# Patient Record
Sex: FEMALE | Race: BLACK OR AFRICAN AMERICAN | Employment: UNEMPLOYED | RURAL
[De-identification: names, ages, dates, MRNs, and addresses within clinical notes are randomized per-mention and may not be internally consistent; named-entity substitution may affect disease eponyms.]

---

## 2019-09-30 ENCOUNTER — OFFICE VISIT (OUTPATIENT)
Dept: INTERNAL MEDICINE CLINIC | Age: 54
End: 2019-09-30

## 2019-09-30 VITALS
DIASTOLIC BLOOD PRESSURE: 82 MMHG | RESPIRATION RATE: 16 BRPM | TEMPERATURE: 98.1 F | SYSTOLIC BLOOD PRESSURE: 136 MMHG | HEART RATE: 73 BPM | WEIGHT: 192 LBS | HEIGHT: 65 IN | BODY MASS INDEX: 31.99 KG/M2 | OXYGEN SATURATION: 98 %

## 2019-09-30 DIAGNOSIS — M79.673 PAIN OF FOOT, UNSPECIFIED LATERALITY: ICD-10-CM

## 2019-09-30 DIAGNOSIS — Z13.220 SCREENING CHOLESTEROL LEVEL: ICD-10-CM

## 2019-09-30 DIAGNOSIS — I10 HYPERTENSION, ESSENTIAL: Primary | ICD-10-CM

## 2019-09-30 DIAGNOSIS — Z72.0 TOBACCO ABUSE: ICD-10-CM

## 2019-09-30 DIAGNOSIS — K21.9 GASTROESOPHAGEAL REFLUX DISEASE, ESOPHAGITIS PRESENCE NOT SPECIFIED: ICD-10-CM

## 2019-09-30 RX ORDER — OMEPRAZOLE 40 MG/1
40 CAPSULE, DELAYED RELEASE ORAL DAILY
COMMUNITY
End: 2019-09-30 | Stop reason: SDUPTHER

## 2019-09-30 RX ORDER — MELOXICAM 7.5 MG/1
7.5 TABLET ORAL DAILY
Qty: 90 TAB | Refills: 1 | Status: SHIPPED | OUTPATIENT
Start: 2019-09-30 | End: 2019-12-12 | Stop reason: ALTCHOICE

## 2019-09-30 RX ORDER — AMLODIPINE BESYLATE 5 MG/1
5 TABLET ORAL DAILY
Qty: 90 TAB | Refills: 1 | Status: SHIPPED | OUTPATIENT
Start: 2019-09-30 | End: 2020-04-07

## 2019-09-30 RX ORDER — OMEPRAZOLE 40 MG/1
40 CAPSULE, DELAYED RELEASE ORAL DAILY
Qty: 90 CAP | Refills: 1 | Status: SHIPPED | OUTPATIENT
Start: 2019-09-30 | End: 2020-04-07

## 2019-09-30 RX ORDER — MELOXICAM 7.5 MG/1
TABLET ORAL DAILY
COMMUNITY
End: 2019-09-30 | Stop reason: SDUPTHER

## 2019-09-30 RX ORDER — AMLODIPINE BESYLATE 5 MG/1
5 TABLET ORAL DAILY
COMMUNITY
End: 2019-09-30 | Stop reason: SDUPTHER

## 2019-09-30 RX ORDER — BUPROPION HYDROCHLORIDE 100 MG/1
100 TABLET ORAL 2 TIMES DAILY
Qty: 60 TAB | Refills: 2 | Status: SHIPPED | OUTPATIENT
Start: 2019-09-30 | End: 2019-12-12 | Stop reason: ALTCHOICE

## 2019-09-30 NOTE — PROGRESS NOTES
Subjective:     Inga Jama is a 48 y.o. female who presents for follow up of hypertension. Patient is new to this clinic. Comes in to establish care. Previous care was with Dr.collins carty. Reason for the change is: change of ins. Some HB ok with med  New concerns: wants to quit smoking  C/o knots on foot that hurts    Current Outpatient Medications   Medication Sig Dispense Refill    amLODIPine (NORVASC) 5 mg tablet Take 5 mg by mouth daily.  omeprazole (PRILOSEC) 40 mg capsule Take 40 mg by mouth daily.  meloxicam (MOBIC) 7.5 mg tablet Take  by mouth daily. No Known Allergies    Diet and Lifestyle: smoker 0.5 pack daily, alcohol intake occa beer or shot    Cardiovascular ROS: taking medications as instructed, no medication side effects noted, no TIA's, no chest pain on exertion, no dyspnea on exertion, no swelling of ankles. Review of Systems, additional:  Pertinent items are noted in HPI. Patient Active Problem List    Diagnosis Date Noted    Hypertension, essential 09/30/2019    Tobacco abuse 09/30/2019    GERD (gastroesophageal reflux disease) 09/30/2019     Current Outpatient Medications   Medication Sig Dispense Refill    amLODIPine (NORVASC) 5 mg tablet Take 5 mg by mouth daily.  omeprazole (PRILOSEC) 40 mg capsule Take 40 mg by mouth daily.  meloxicam (MOBIC) 7.5 mg tablet Take  by mouth daily. No Known Allergies  Past Medical History:   Diagnosis Date    Asthma     GERD (gastroesophageal reflux disease)      Past Surgical History:   Procedure Laterality Date    HX TUBAL LIGATION      btl     Family History   Problem Relation Age of Onset    Cancer Father      Social History     Tobacco Use    Smoking status: Current Every Day Smoker     Packs/day: 0.50    Smokeless tobacco: Never Used   Substance Use Topics    Alcohol use:  Yes     Alcohol/week: 1.0 standard drinks     Types: 1 Shots of liquor per week     Comment: occ Objective:     Physical exam significant for the following: WNL    Visit Vitals  /82   Pulse 73   Temp 98.1 °F (36.7 °C) (Oral)   Resp 16   Ht 5' 5\" (1.651 m)   Wt 192 lb (87.1 kg)   LMP 09/25/2019 (Approximate)   SpO2 98%   BMI 31.95 kg/m²     Appearance: alert, well appearing, and in no distress. General exam: CVS exam BP noted to be well controlled today in office, S1, S2 normal, no gallop, no murmur, chest clear, no JVD, no HSM, no edema. . 2 cm nodules sq on feet    Assessment/Plan:     hypertension well controlled, stable      ICD-10-CM ICD-9-CM    1. Hypertension, essential I10 401.9 amLODIPine (NORVASC) 5 mg tablet      METABOLIC PANEL, COMPREHENSIVE   2. Tobacco abuse Z72.0 305.1 buPROPion (WELLBUTRIN) 100 mg tablet   3. Gastroesophageal reflux disease, esophagitis presence not specified K21.9 530.81 omeprazole (PRILOSEC) 40 mg capsule   4. Pain of foot, unspecified laterality M79.673 729.5 meloxicam (MOBIC) 7.5 mg tablet      REFERRAL TO PODIATRY   5. Screening cholesterol level Z13.220 V77.91 LIPID PANEL     . Orders Placed This Encounter    METABOLIC PANEL, COMPREHENSIVE     Standing Status:   Future     Standing Expiration Date:   3/30/2020    LIPID PANEL     Standing Status:   Future     Standing Expiration Date:   3/30/2020    REFERRAL TO PODIATRY     Referral Priority:   Routine     Referral Type:   Consultation     Referral Reason:   Specialty Services Required     Referred to Provider:   Rachael Catalan DPM     Number of Visits Requested:   1    DISCONTD: amLODIPine (NORVASC) 5 mg tablet     Sig: Take 5 mg by mouth daily.  DISCONTD: omeprazole (PRILOSEC) 40 mg capsule     Sig: Take 40 mg by mouth daily.  DISCONTD: meloxicam (MOBIC) 7.5 mg tablet     Sig: Take  by mouth daily.  amLODIPine (NORVASC) 5 mg tablet     Sig: Take 1 Tab by mouth daily. Dispense:  90 Tab     Refill:  1    omeprazole (PRILOSEC) 40 mg capsule     Sig: Take 1 Cap by mouth daily. Dispense:  90 Cap     Refill:  1    meloxicam (MOBIC) 7.5 mg tablet     Sig: Take 1 Tab by mouth daily. Dispense:  90 Tab     Refill:  1    buPROPion (WELLBUTRIN) 100 mg tablet     Sig: Take 1 Tab by mouth two (2) times a day. Start 1 tablet daily ncrease as tolerated, quit. Indications: Stop Smoking     Dispense:  60 Tab     Refill:  2     Follow-up and Dispositions    · Return in about 6 weeks (around 11/11/2019) for routine follow up.

## 2019-09-30 NOTE — PATIENT INSTRUCTIONS
Deciding About Using Medicines To Quit Smoking  How can you decide about using medicines to quit smoking? What are the medicines you can use? Your doctor may prescribe varenicline (Chantix) or bupropion (Zyban). These medicines can help you cope with cravings for tobacco. They are pills that don't contain nicotine. You also can use nicotine replacement products. These do contain nicotine. There are many types. · Gum and lozenges slowly release nicotine into your mouth. · Patches stick to your skin. They slowly release nicotine into your bloodstream.  · An inhaler has a deras that contains nicotine. You breathe in a puff of nicotine vapor through your mouth and throat. · Nasal spray releases a mist that contains nicotine. What are key points about this decision? · Using medicines can double your chances of quitting smoking. They can ease cravings and withdrawal symptoms. · Getting counseling along with using medicine can raise your chances of quitting even more. · If you smoke fewer than 5 cigarettes a day, you may not need medicines to help you quit smoking. · These medicines have less nicotine than cigarettes. And by itself, nicotine is not nearly as harmful as smoking. The tars, carbon monoxide, and other toxic chemicals in tobacco cause the harmful effects. · The side effects of nicotine replacement products depend on the type of product. For example, a patch can make your skin red and itchy. Medicines in pill form can make you sick to your stomach. They can also cause dry mouth and trouble sleeping. For most people, the side effects are not bad enough to make them stop using the products. Why might you choose to use medicines to quit smoking? · You have tried on your own to stop smoking, but you were not able to stop. · You smoke more than 5 cigarettes a day. · You want to increase your chances of quitting smoking. · You want to reduce your cravings and withdrawal symptoms.   · You feel the benefits of medicine outweigh the side effects. Why might you choose not to use medicine? · You want to try quitting on your own by stopping all at once (\"cold turkey\"). · You want to cut back slowly on the number of cigarettes you smoke. · You smoke fewer than 5 cigarettes a day. · You do not like using medicine. · You feel the side effects of medicines outweigh the benefits. · You are worried about the cost of medicines. Your decision  Thinking about the facts and your feelings can help you make a decision that is right for you. Be sure you understand the benefits and risks of your options, and think about what else you need to do before you make the decision. Where can you learn more? Go to http://lenny-alan.info/. Enter B861 in the search box to learn more about \"Deciding About Using Medicines To Quit Smoking. \"  Current as of: September 26, 2018  Content Version: 12.2  © 8566-2654 [employer]. Care instructions adapted under license by [employer] (which disclaims liability or warranty for this information). If you have questions about a medical condition or this instruction, always ask your healthcare professional. [place] [address] any warranty or liability for your use of this information.

## 2019-09-30 NOTE — PROGRESS NOTES
New patient to establish care - rash - blood pressure meds refilled - wants to be tested for sleep apnea  Yesica Harper LPN  6/86/1886  5:05 PM  Former patient of Tawny Lopez at [address] [place] [address]  4/26/1462  2:54 PM

## 2019-10-02 ENCOUNTER — DOCUMENTATION ONLY (OUTPATIENT)
Dept: INTERNAL MEDICINE CLINIC | Age: 54
End: 2019-10-02

## 2019-11-12 LAB
ALBUMIN SERPL-MCNC: 4.2 G/DL (ref 3.5–5.5)
ALBUMIN/GLOB SERPL: 1.3 {RATIO} (ref 1.2–2.2)
ALP SERPL-CCNC: 70 IU/L (ref 39–117)
ALT SERPL-CCNC: 23 IU/L (ref 0–32)
AST SERPL-CCNC: 24 IU/L (ref 0–40)
BILIRUB SERPL-MCNC: <0.2 MG/DL (ref 0–1.2)
BUN SERPL-MCNC: 8 MG/DL (ref 6–24)
BUN/CREAT SERPL: 13 (ref 9–23)
CALCIUM SERPL-MCNC: 9.5 MG/DL (ref 8.7–10.2)
CHLORIDE SERPL-SCNC: 102 MMOL/L (ref 96–106)
CHOLEST SERPL-MCNC: 189 MG/DL (ref 100–199)
CO2 SERPL-SCNC: 23 MMOL/L (ref 20–29)
CREAT SERPL-MCNC: 0.63 MG/DL (ref 0.57–1)
GLOBULIN SER CALC-MCNC: 3.3 G/DL (ref 1.5–4.5)
GLUCOSE SERPL-MCNC: 97 MG/DL (ref 65–99)
HDLC SERPL-MCNC: 33 MG/DL
INTERPRETATION, 910389: NORMAL
LDLC SERPL CALC-MCNC: ABNORMAL MG/DL (ref 0–99)
PDF IMAGE, 910387: NORMAL
POTASSIUM SERPL-SCNC: 4 MMOL/L (ref 3.5–5.2)
PROT SERPL-MCNC: 7.5 G/DL (ref 6–8.5)
SODIUM SERPL-SCNC: 139 MMOL/L (ref 134–144)
TRIGL SERPL-MCNC: 544 MG/DL (ref 0–149)
VLDLC SERPL CALC-MCNC: ABNORMAL MG/DL (ref 5–40)

## 2019-11-22 ENCOUNTER — OFFICE VISIT (OUTPATIENT)
Dept: INTERNAL MEDICINE CLINIC | Age: 54
End: 2019-11-22

## 2019-11-22 VITALS
HEIGHT: 65 IN | OXYGEN SATURATION: 99 % | TEMPERATURE: 98 F | BODY MASS INDEX: 32.82 KG/M2 | WEIGHT: 197 LBS | DIASTOLIC BLOOD PRESSURE: 85 MMHG | RESPIRATION RATE: 16 BRPM | SYSTOLIC BLOOD PRESSURE: 129 MMHG | HEART RATE: 70 BPM

## 2019-11-22 DIAGNOSIS — Z72.0 TOBACCO ABUSE: ICD-10-CM

## 2019-11-22 DIAGNOSIS — K21.9 GASTROESOPHAGEAL REFLUX DISEASE, ESOPHAGITIS PRESENCE NOT SPECIFIED: ICD-10-CM

## 2019-11-22 DIAGNOSIS — I10 HYPERTENSION, ESSENTIAL: Primary | ICD-10-CM

## 2019-11-22 DIAGNOSIS — M72.2 PLANTAR FIBROMATOSIS: ICD-10-CM

## 2019-11-22 NOTE — PROGRESS NOTES
Subjective:     Jaylene Verduzco is a 48 y.o. female who presents for follow up of hypertension. 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 Current Outpatient Medications   Medication Sig Dispense Refill    amLODIPine (NORVASC) 5 mg tablet Take 1 Tab by mouth daily. 90 Tab 1    omeprazole (PRILOSEC) 40 mg capsule Take 1 Cap by mouth daily. 90 Cap 1    meloxicam (MOBIC) 7.5 mg tablet Take 1 Tab by mouth daily. 90 Tab 1    buPROPion (WELLBUTRIN) 100 mg tablet Take 1 Tab by mouth two (2) times a day. Start 1 tablet daily ncrease as tolerated, quit. Indications: Stop Smoking 60 Tab 2     No Known Allergies    Diet and Lifestyle: smoker takes her Wellbutrin, getting ready to quit    Cardiovascular ROS: taking medications as instructed, no medication side effects noted, no TIA's, no chest pain on exertion, no dyspnea on exertion, no swelling of ankles. Review of Systems, additional:  Pertinent items are noted in HPI. Patient Active Problem List    Diagnosis Date Noted    Hypertension, essential 09/30/2019    Tobacco abuse 09/30/2019    GERD (gastroesophageal reflux disease) 09/30/2019     Social History     Tobacco Use    Smoking status: Current Every Day Smoker     Packs/day: 0.50    Smokeless tobacco: Never Used   Substance Use Topics    Alcohol use: Yes     Alcohol/week: 1.0 standard drinks     Types: 1 Shots of liquor per week     Comment: occ        Lab Results   Component Value Date/Time    Cholesterol, total 189 11/11/2019 10:17 AM    HDL Cholesterol 33 (L) 11/11/2019 10:17 AM    LDL, calculated Comment 11/11/2019 10:17 AM    Triglyceride 544 (H) 11/11/2019 10:17 AM     Lab Results   Component Value Date/Time    ALT (SGPT) 23 11/11/2019 10:17 AM    AST (SGOT) 24 11/11/2019 10:17 AM    Alk.  phosphatase 70 11/11/2019 10:17 AM    Bilirubin, total <0.2 11/11/2019 10:17 AM    Albumin 4.2 11/11/2019 10:17 AM    Protein, total 7.5 11/11/2019 10:17 AM     No results found for: INR, INREXT, PTMR, PTP, PT1, PT2, INREXT   Lab Results   Component Value Date/Time    GFR est non- 11/11/2019 10:17 AM    GFR est  11/11/2019 10:17 AM    Creatinine 0.63 11/11/2019 10:17 AM    BUN 8 11/11/2019 10:17 AM    Sodium 139 11/11/2019 10:17 AM    Potassium 4.0 11/11/2019 10:17 AM    Chloride 102 11/11/2019 10:17 AM    CO2 23 11/11/2019 10:17 AM     Lab Results   Component Value Date/Time    Glucose 97 11/11/2019 10:17 AM             Objective:     Physical exam significant for the following: WNL    Visit Vitals  /85 (BP 1 Location: Left arm, BP Patient Position: Sitting)   Pulse 70   Temp 98 °F (36.7 °C) (Oral)   Resp 16   Ht 5' 5\" (1.651 m)   Wt 197 lb (89.4 kg)   SpO2 99%   BMI 32.78 kg/m²     Appearance: alert, well appearing, and in no distress. General exam: CVS exam BP noted to be well controlled today in office, S1, S2 normal, no gallop, no murmur, chest clear, no JVD, no HSM, no edema. .   Assessment/Plan:     hypertension well controlled, stable      ICD-10-CM ICD-9-CM    1. Hypertension, essential I10 401.9    2. Tobacco abuse Z72.0 305.1    3. Gastroesophageal reflux disease, esophagitis presence not specified K21.9 530.81    4. Plantar fibromatosis M72.2 728.71      . Above issues stable. surgery per podiatry  Emphasized need to quit smoking especially with recovery from surgery and the need to set a quit date which she has done.     Follow-up and Dispositions    · Return in about 6 weeks (around 1/3/2020) for full physical.

## 2019-11-22 NOTE — LETTER
[facility] introduces orangutrans patient portal. Now you can access parts of your medical record, email your doctor's office, and request medication refills online. 1. In your internet browser, go to [email or web address] 
2. Click on the First Time User? Click Here link in the Sign In box. You will see the New Member Sign Up page. 3. Enter your orangutrans Access Code exactly as it appears below. You will not need to use this code after youve completed the sign-up process. If you do not sign up before the expiration date, you must request a new code. · orangutrans Access Code: [email or web address] · Expires: 1/6/2020 10:48 AM 
 
4. Enter the last four digits of your Social Security Number (xxxx) and Date of Birth (mm/dd/yyyy) as indicated and click Submit. You will be taken to the next sign-up page. 5. Create a orangutrans ID. This will be your orangutrans login ID and cannot be changed, so think of one that is secure and easy to remember. 6. Create a orangutrans password. You can change your password at any time. 7. Enter your Password Reset Question and Answer. This can be used at a later time if you forget your password. 8. Enter your e-mail address. You will receive e-mail notification when new information is available in [address] 9. Click Sign Up. You can now view and download portions of your medical record. 10. Click the Download Summary menu link to download a portable copy of your medical information. If you have questions, please visit the Frequently Asked Questions section of the orangutrans website. Remember, orangutrans is NOT to be used for urgent needs. For medical emergencies, dial 911. Now available from your iPhone and Android!

## 2019-11-22 NOTE — PROGRESS NOTES
Chief Complaint   Patient presents with    Foot Pain     L/foot pain follow up - surgery Dr. Taylor Loop Dec 6th     I have reviewed the patient's medical history in detail and updated the computerized patient record. Health Maintenance reviewed. 1. Have you been to the ER, urgent care clinic since your last visit? Hospitalized since your last visit?no    2. Have you seen or consulted any other health care providers outside of the [address] [place] [address] [place] since your last visit? Include any pap smears or colon screening. No      Encouraged pt to discuss pt's wishes with spouse/partner/family and bring them in the next appt to follow thru with the Advanced Directive      Fall Risk Assessment, last 12 mths 11/22/2019   Able to walk? Yes   Fall in past 12 months? No       3 most recent PHQ Screens 11/22/2019   Little interest or pleasure in doing things Several days   Feeling down, depressed, irritable, or hopeless Several days   Total Score PHQ 2 2       Abuse Screening Questionnaire 11/22/2019   Do you ever feel afraid of your partner? N   Are you in a relationship with someone who physically or mentally threatens you? N   Is it safe for you to go home?  Y       ADL Assessment 11/22/2019   Feeding yourself No Help Needed   Getting from bed to chair No Help Needed   Getting dressed No Help Needed   Bathing or showering No Help Needed   Walk across the room (includes cane/walker) No Help Needed   Using the telphone No Help Needed   Taking your medications No Help Needed   Preparing meals No Help Needed   Managing money (expenses/bills) No Help Needed   Moderately strenuous housework (laundry) No Help Needed   Shopping for personal items (toiletries/medicines) No Help Needed   Shopping for groceries No Help Needed   Driving No Help Needed   Climbing a flight of stairs No Help Needed   Getting to places beyond walking distances No Help Needed

## 2019-11-23 NOTE — PROGRESS NOTES
Ms Holley Rosario. Your blood work is fairly normal except the triglyceride level is moderately elevated at 544 but your overall cholesterol level is good (< 200). Elevated triglyceride has been associated with a mild increased risk of strokes and heart attacks. Please increase your exercise and follow a low-cholesterol low-fat diet to reduce your triglyceride levels. We should recheck these levels in 6 months. If you have any questions feel free to call my office.

## 2019-11-25 ENCOUNTER — TELEPHONE (OUTPATIENT)
Dept: INTERNAL MEDICINE CLINIC | Age: 54
End: 2019-11-25

## 2019-11-25 NOTE — TELEPHONE ENCOUNTER
----- Message from [employer] sent at 11/25/2019 10:17 AM EST -----  Regarding: Dr. Katherine Norman first and last name:  Pt    Reason for call:  Pt requesting to speak with Dr. Luis Alfred or nurse about triglyceride levels    Callback required yes/no and why:  yes    Best contact number(s):  [phone]    Details to clarify the request:  [address]

## 2019-12-02 ENCOUNTER — TELEPHONE (OUTPATIENT)
Dept: INTERNAL MEDICINE CLINIC | Age: 54
End: 2019-12-02

## 2019-12-12 ENCOUNTER — OFFICE VISIT (OUTPATIENT)
Dept: INTERNAL MEDICINE CLINIC | Age: 54
End: 2019-12-12

## 2019-12-12 VITALS
WEIGHT: 192 LBS | HEIGHT: 65 IN | RESPIRATION RATE: 16 BRPM | TEMPERATURE: 98.1 F | SYSTOLIC BLOOD PRESSURE: 121 MMHG | HEART RATE: 70 BPM | BODY MASS INDEX: 31.99 KG/M2 | DIASTOLIC BLOOD PRESSURE: 77 MMHG | OXYGEN SATURATION: 98 %

## 2019-12-12 DIAGNOSIS — K35.32 RUPTURE OF APPENDIX: Primary | ICD-10-CM

## 2019-12-12 DIAGNOSIS — I10 HYPERTENSION, ESSENTIAL: ICD-10-CM

## 2019-12-12 DIAGNOSIS — B37.0 THRUSH: ICD-10-CM

## 2019-12-12 PROBLEM — K38.9 APPENDIX DISEASE: Status: ACTIVE | Noted: 2019-12-12

## 2019-12-12 RX ORDER — ASPIRIN 81 MG/1
81 TABLET ORAL
COMMUNITY

## 2019-12-12 RX ORDER — METRONIDAZOLE 500 MG/1
TABLET ORAL 3 TIMES DAILY
COMMUNITY
End: 2020-04-13 | Stop reason: ALTCHOICE

## 2019-12-12 RX ORDER — FLUCONAZOLE 100 MG/1
100 TABLET ORAL DAILY
Qty: 7 TAB | Refills: 0 | Status: SHIPPED | OUTPATIENT
Start: 2019-12-12 | End: 2019-12-19

## 2019-12-12 RX ORDER — OXYCODONE HYDROCHLORIDE 5 MG/1
5 TABLET ORAL
COMMUNITY
Start: 2019-12-06 | End: 2020-02-04

## 2019-12-12 RX ORDER — POTASSIUM CHLORIDE 750 MG/1
20 TABLET, FILM COATED, EXTENDED RELEASE ORAL 2 TIMES DAILY
COMMUNITY
Start: 2019-12-08 | End: 2020-04-13 | Stop reason: ALTCHOICE

## 2019-12-12 RX ORDER — CIPROFLOXACIN 500 MG/1
TABLET ORAL 2 TIMES DAILY
COMMUNITY
End: 2020-04-13 | Stop reason: ALTCHOICE

## 2019-12-12 RX ORDER — NYSTATIN 100000 [USP'U]/ML
500000 SUSPENSION ORAL
COMMUNITY
Start: 2019-12-08 | End: 2019-12-12 | Stop reason: ALTCHOICE

## 2019-12-12 NOTE — PROGRESS NOTES
HISTORY OF PRESENT ILLNESS  Jarret Jeffery is a 48 y.o. female. Abdominal Pain   The history is provided by the patient and spouse. This is a new problem. The current episode started more than 1 week ago. The problem occurs hourly. The problem has been gradually improving. Associated symptoms include abdominal pain. Pertinent negatives include no chest pain and no shortness of breath. Treatments tried: went to [place] with epigastric and right ando pain eventually Dx with appendoicitis with had ruptured, underwent surgery. Tongue Swelling   Associated symptoms include abdominal pain. Pertinent negatives include no chest pain and no shortness of breath. Finishing coarse of AB's c/o mouth pain and nystatin not working  Drainage tubes removed, cont to see surgeon    Past Surgical History:   Procedure Laterality Date    HX APPENDECTOMY  12/03/2019    HX TUBAL LIGATION      btl       Review of Systems   Respiratory: Negative for shortness of breath. Cardiovascular: Negative for chest pain. Gastrointestinal: Positive for abdominal pain. Current Outpatient Medications   Medication Sig Dispense Refill    nystatin (MYCOSTATIN) 100,000 unit/mL suspension Take 500,000 Units by mouth.  potassium chloride SR (KLOR-CON 10) 10 mEq tablet Take 20 mEq by mouth.  oxyCODONE IR (ROXICODONE) 5 mg immediate release tablet Take 5 mg by mouth.  aspirin delayed-release 81 mg tablet Take 81 mg by mouth.  ciprofloxacin HCl (CIPRO) 500 mg tablet Take  by mouth two (2) times a day.  metroNIDAZOLE (FLAGYL) 500 mg tablet Take  by mouth three (3) times daily.  amLODIPine (NORVASC) 5 mg tablet Take 1 Tab by mouth daily. 90 Tab 1    omeprazole (PRILOSEC) 40 mg capsule Take 1 Cap by mouth daily. 90 Cap 1    meloxicam (MOBIC) 7.5 mg tablet Take 1 Tab by mouth daily.  90 Tab 1     No Known Allergies    Social History     Socioeconomic History    Marital status:      Spouse name: Not on file    Number of children: Not on file    Years of education: Not on file    Highest education level: Not on file   Occupational History    Not on file   Social Needs    Financial resource strain: Not on file    Food insecurity:     Worry: Not on file     Inability: Not on file    Transportation needs:     Medical: Not on file     Non-medical: Not on file   Tobacco Use    Smoking status: Former Smoker     Packs/day: 0.50     Last attempt to quit: 2019     Years since quittin.0    Smokeless tobacco: Never Used   Substance and Sexual Activity    Alcohol use: Yes     Alcohol/week: 1.0 standard drinks     Types: 1 Shots of liquor per week     Comment: occ    Drug use: No    Sexual activity: Yes     Partners: Male   Lifestyle    Physical activity:     Days per week: Not on file     Minutes per session: Not on file    Stress: Not on file   Relationships    Social connections:     Talks on phone: Not on file     Gets together: Not on file     Attends Mandaeism service: Not on file     Active member of club or organization: Not on file     Attends meetings of clubs or organizations: Not on file     Relationship status: Not on file    Intimate partner violence:     Fear of current or ex partner: Not on file     Emotionally abused: Not on file     Physically abused: Not on file     Forced sexual activity: Not on file   Other Topics Concern    Not on file   Social History Narrative    Not on file       Physical Exam  Visit Vitals  /77 (BP 1 Location: Left arm, BP Patient Position: Sitting)   Pulse 70   Temp 98.1 °F (36.7 °C) (Oral)   Resp 16   Ht 5' 5\" (1.651 m)   Wt 192 lb (87.1 kg)   SpO2 98%   BMI 31.95 kg/m²       WDWN NAD   Mouth membranes raw and red no thrush  TM clear, throat wnl  Neck no adenopathy  Heart RRR no C/M/R  Lungs CTA  Abdo soft well healed incisions  Ext No redness swelling or edema    ASSESSMENT and PLAN  Encounter Diagnoses   Name Primary?     Rupture of appendix Yes    Hypertension, essential     Thrush      Orders Placed This Encounter    DISCONTD: nystatin (MYCOSTATIN) 100,000 unit/mL suspension    potassium chloride SR (KLOR-CON 10) 10 mEq tablet    oxyCODONE IR (ROXICODONE) 5 mg immediate release tablet    aspirin delayed-release 81 mg tablet    ciprofloxacin HCl (CIPRO) 500 mg tablet    metroNIDAZOLE (FLAGYL) 500 mg tablet    fluconazole (DIFLUCAN) 100 mg tablet     Finish Abs f/u with surgeon  Rx for thrush as above  HTN stable cont meds    Follow-up and Dispositions    · Return in about 4 months (around 4/12/2020), or if symptoms worsen or fail to improve, for routine follow up.

## 2019-12-12 NOTE — PROGRESS NOTES
Chief Complaint   Patient presents with    Abdominal Pain     surgery appendix follow up     I have reviewed the patient's medical history in detail and updated the computerized patient record. Health Maintenance reviewed. 1. Have you been to the ER, urgent care clinic since your last visit? Hospitalized since your last visit? yes    2. Have you seen or consulted any other health care providers outside of the [address] [place] [address] since your last visit? Include any pap smears or colon screening. [facility] [address] [facility]      Encouraged pt to discuss pt's wishes with spouse/partner/family and bring them in the next appt to follow thru with the Advanced Directive    Fall Risk Assessment, last 12 mths 11/22/2019   Able to walk? Yes   Fall in past 12 months? No       3 most recent PHQ Screens 12/12/2019   Little interest or pleasure in doing things Several days   Feeling down, depressed, irritable, or hopeless Several days   Total Score PHQ 2 2       Abuse Screening Questionnaire 11/22/2019   Do you ever feel afraid of your partner? N   Are you in a relationship with someone who physically or mentally threatens you? N   Is it safe for you to go home?  Y       ADL Assessment 11/22/2019   Feeding yourself No Help Needed   Getting from bed to chair No Help Needed   Getting dressed No Help Needed   Bathing or showering No Help Needed   Walk across the room (includes cane/walker) No Help Needed   Using the telphone No Help Needed   Taking your medications No Help Needed   Preparing meals No Help Needed   Managing money (expenses/bills) No Help Needed   Moderately strenuous housework (laundry) No Help Needed   Shopping for personal items (toiletries/medicines) No Help Needed   Shopping for groceries No Help Needed   Driving No Help Needed   Climbing a flight of stairs No Help Needed   Getting to places beyond walking distances No Help Needed

## 2020-04-07 DIAGNOSIS — I10 HYPERTENSION, ESSENTIAL: ICD-10-CM

## 2020-04-07 DIAGNOSIS — M79.673 PAIN OF FOOT, UNSPECIFIED LATERALITY: ICD-10-CM

## 2020-04-07 DIAGNOSIS — K21.9 GASTROESOPHAGEAL REFLUX DISEASE, ESOPHAGITIS PRESENCE NOT SPECIFIED: ICD-10-CM

## 2020-04-07 RX ORDER — AMLODIPINE BESYLATE 5 MG/1
TABLET ORAL
Qty: 90 TAB | Refills: 0 | Status: SHIPPED | OUTPATIENT
Start: 2020-04-07 | End: 2020-04-13 | Stop reason: SDUPTHER

## 2020-04-07 RX ORDER — OMEPRAZOLE 40 MG/1
CAPSULE, DELAYED RELEASE ORAL
Qty: 90 CAP | Refills: 0 | Status: SHIPPED | OUTPATIENT
Start: 2020-04-07 | End: 2020-04-13 | Stop reason: SDUPTHER

## 2020-04-07 RX ORDER — MELOXICAM 7.5 MG/1
TABLET ORAL
Qty: 90 TAB | Refills: 0 | Status: SHIPPED | OUTPATIENT
Start: 2020-04-07 | End: 2020-04-13 | Stop reason: SDUPTHER

## 2020-04-13 ENCOUNTER — VIRTUAL VISIT (OUTPATIENT)
Dept: INTERNAL MEDICINE CLINIC | Age: 55
End: 2020-04-13

## 2020-04-13 DIAGNOSIS — K21.9 GASTROESOPHAGEAL REFLUX DISEASE, ESOPHAGITIS PRESENCE NOT SPECIFIED: ICD-10-CM

## 2020-04-13 DIAGNOSIS — I10 HYPERTENSION, ESSENTIAL: ICD-10-CM

## 2020-04-13 DIAGNOSIS — M79.673 PAIN OF FOOT, UNSPECIFIED LATERALITY: ICD-10-CM

## 2020-04-13 DIAGNOSIS — J45.20 MILD INTERMITTENT REACTIVE AIRWAY DISEASE WITHOUT COMPLICATION: Primary | ICD-10-CM

## 2020-04-13 RX ORDER — OMEPRAZOLE 40 MG/1
CAPSULE, DELAYED RELEASE ORAL
Qty: 90 CAP | Refills: 3 | Status: SHIPPED | OUTPATIENT
Start: 2020-04-13 | End: 2021-06-07

## 2020-04-13 RX ORDER — MELOXICAM 7.5 MG/1
TABLET ORAL
Qty: 90 TAB | Refills: 3 | Status: SHIPPED | OUTPATIENT
Start: 2020-04-13 | End: 2021-06-07

## 2020-04-13 RX ORDER — AMLODIPINE BESYLATE 5 MG/1
TABLET ORAL
Qty: 90 TAB | Refills: 3 | Status: SHIPPED | OUTPATIENT
Start: 2020-04-13 | End: 2021-06-07

## 2020-04-13 RX ORDER — ALBUTEROL SULFATE 90 UG/1
1 AEROSOL, METERED RESPIRATORY (INHALATION)
Qty: 1 INHALER | Refills: 5 | Status: SHIPPED | OUTPATIENT
Start: 2020-04-13 | End: 2021-06-07

## 2020-04-13 NOTE — PROGRESS NOTES
Consent: Lelon Olszewski, who was seen by synchronous (real-time) audio-video technology, and/or her healthcare decision maker, is aware that this patient-initiated, Telehealth encounter on 2020 is a billable service, with coverage as determined by her insurance carrier. She is aware that she may receive a bill and has provided verbal consent to proceed: Yes. Pt in vehicle, I was in home        Subjective:   Lelon Olszewski is a 48 y.o. female who complains of sore throat for 7-14 days, stable since that time. [employer] her wheezes some. She denies a history of fevers and cough. HB doing ok  Evaluation to date: none. Treatment to date: none. Patient does not smoke cigarettes. Relevant PMH: Asthma and touch of that. Reports taking blood pressure medications without side affects. No complaints of exertional chest pain, excessive shortness of breath or focal weakness. Minimal swelling in lower legs or dizziness with standing. C/o foot pain nsaid helps. No Known Allergies      Patient Active Problem List    Diagnosis Date Noted    Appendix disease 2019    Hypertension, essential 2019    GERD (gastroesophageal reflux disease) 2019       No Known Allergies  Social History     Tobacco Use    Smoking status: Former Smoker     Packs/day: 0.50     Last attempt to quit: 2019     Years since quittin.3    Smokeless tobacco: Never Used   Substance Use Topics    Alcohol use: Yes     Alcohol/week: 1.0 standard drinks     Types: 1 Shots of liquor per week     Comment: occ        Review of Systems  Pertinent items are noted in HPI. Objective:     Visit Vitals  LMP 2020 (Approximate)     General:  alert, cooperative, no distress   Eyes: negative   Ears:    Sinuses:    Mouth:     Neck:    Heart:    Lungs:    Abdomen:       Assessment/Plan:   viral upper respiratory illness and asthma  RTC prn. Discussed diagnosis and treatment of viral URIs.   Discussed the importance of avoiding unnecessary antibiotic therapy. Encounter Diagnoses   Name Primary?  Mild intermittent reactive airway disease without complication Yes    Gastroesophageal reflux disease, esophagitis presence not specified     Hypertension, essential     Pain of foot, unspecified laterality      Orders Placed This Encounter    omeprazole (PRILOSEC) 40 mg capsule    amLODIPine (NORVASC) 5 mg tablet    meloxicam (MOBIC) 7.5 mg tablet    albuterol (PROVENTIL HFA, VENTOLIN HFA, PROAIR HFA) 90 mcg/actuation inhaler   . Try above inhaler  Discussed possible side affects, precautions, and drug interactions and possible benefits of the medication(s). Follow-up and Dispositions    · Return in about 4 months (around 8/13/2020).

## 2020-04-13 NOTE — PROGRESS NOTES
Chief Complaint   Patient presents with    Hypertension     follow up    Allergies     throat feels thick and itchy after being outside in the pollen     No vitals taken - wants to discuss med for allergies  Mariposa David LPN  0/29/6278  9:91 AM    Fall Risk Assessment, last 12 mths 4/13/2020   Able to walk? Yes   Fall in past 12 months? No       3 most recent PHQ Screens 4/13/2020   Little interest or pleasure in doing things Not at all   Feeling down, depressed, irritable, or hopeless Not at all   Total Score PHQ 2 0       Abuse Screening Questionnaire 4/13/2020   Do you ever feel afraid of your partner? N   Are you in a relationship with someone who physically or mentally threatens you? N   Is it safe for you to go home?  Y       ADL Assessment 4/13/2020   Feeding yourself No Help Needed   Getting from bed to chair No Help Needed   Getting dressed No Help Needed   Bathing or showering No Help Needed   Walk across the room (includes cane/walker) No Help Needed   Using the telphone No Help Needed   Taking your medications No Help Needed   Preparing meals No Help Needed   Managing money (expenses/bills) No Help Needed   Moderately strenuous housework (laundry) No Help Needed   Shopping for personal items (toiletries/medicines) No Help Needed   Shopping for groceries No Help Needed   Driving No Help Needed   Climbing a flight of stairs No Help Needed   Getting to places beyond walking distances No Help Needed

## 2020-07-07 ENCOUNTER — VIRTUAL VISIT (OUTPATIENT)
Dept: INTERNAL MEDICINE CLINIC | Age: 55
End: 2020-07-07

## 2020-07-07 DIAGNOSIS — Z72.0 TOBACCO ABUSE: ICD-10-CM

## 2020-07-07 DIAGNOSIS — I10 HYPERTENSION, ESSENTIAL: ICD-10-CM

## 2020-07-07 DIAGNOSIS — K21.9 GASTROESOPHAGEAL REFLUX DISEASE, ESOPHAGITIS PRESENCE NOT SPECIFIED: ICD-10-CM

## 2020-07-07 DIAGNOSIS — L50.9 URTICARIA: Primary | ICD-10-CM

## 2020-07-07 RX ORDER — DOXEPIN HYDROCHLORIDE 10 MG/1
10 CAPSULE ORAL 3 TIMES DAILY
Qty: 90 CAP | Refills: 1 | Status: SHIPPED | OUTPATIENT
Start: 2020-07-07 | End: 2021-03-05

## 2020-07-07 RX ORDER — VARENICLINE TARTRATE 25 MG
KIT ORAL
Qty: 1 DOSE PACK | Refills: 0 | Status: SHIPPED | OUTPATIENT
Start: 2020-07-07 | End: 2021-03-05

## 2020-07-07 RX ORDER — PREDNISONE 20 MG/1
40 TABLET ORAL
Qty: 10 TAB | Refills: 0 | Status: SHIPPED | OUTPATIENT
Start: 2020-07-07 | End: 2020-07-12

## 2020-07-07 NOTE — PROGRESS NOTES
Chief Complaint   Patient presents with    Rash     rash, itching and broken blisters to entire body, wants a referral     Health Maintenance reviewed. I have reviewed the patient's medical history in detail and updated the computerized patient record. 1. Have you been to the ER, urgent care clinic since your last visit? Hospitalized since your last visit?no    2. Have you seen or consulted any other health care providers outside of the [address] [place] [address] [place] since your last visit? Include any pap smears or colon screening. No    Encouraged pt to discuss pt's wishes with spouse/partner/family and bring them in the next appt to follow thru with the Advanced Directive    Fall Risk Assessment, last 12 mths 7/7/2020   Able to walk? Yes   Fall in past 12 months? No       3 most recent PHQ Screens 7/7/2020   Little interest or pleasure in doing things Several days   Feeling down, depressed, irritable, or hopeless Several days   Total Score PHQ 2 2       Abuse Screening Questionnaire 7/7/2020   Do you ever feel afraid of your partner? N   Are you in a relationship with someone who physically or mentally threatens you? N   Is it safe for you to go home?  Y       ADL Assessment 7/7/2020   Feeding yourself No Help Needed   Getting from bed to chair No Help Needed   Getting dressed No Help Needed   Bathing or showering No Help Needed   Walk across the room (includes cane/walker) No Help Needed   Using the telphone No Help Needed   Taking your medications No Help Needed   Preparing meals No Help Needed   Managing money (expenses/bills) No Help Needed   Moderately strenuous housework (laundry) No Help Needed   Shopping for personal items (toiletries/medicines) No Help Needed   Shopping for groceries No Help Needed   Driving No Help Needed   Climbing a flight of stairs No Help Needed   Getting to places beyond walking distances No Help Needed

## 2020-07-07 NOTE — PROGRESS NOTES
HISTORY OF PRESENT ILLNESS  Steve Rebollar is a 50 y.o. female. Rash    The history is provided by the patient. This is a new problem. Episode onset: 6-7 mo. The rash is present on the left arm, back, abdomen, face, right arm, left upper leg, left lower leg and neck. The patient is experiencing no pain. Associated symptoms include blisters and itching. Risk factors: re neg. Treatments tried: benedryl. The treatment provided no relief. Review of Systems   Constitutional: Negative for fever. Respiratory: Positive for cough. Cardiovascular: Negative for chest pain. Gastrointestinal: Negative for heartburn. Skin: Positive for itching and rash. 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       Social History     Socioeconomic History    Marital status:      Spouse name: Not on file    Number of children: Not on file    Years of education: Not on file    Highest education level: Not on file   Occupational History    Not on file   Social Needs    Financial resource strain: Not on file    Food insecurity     Worry: Not on file     Inability: Not on file    Transportation needs     Medical: Not on file     Non-medical: Not on file   Tobacco Use    Smoking status: Former Smoker     Packs/day: 0.50     Last attempt to quit: 2019     Years since quittin.5    Smokeless tobacco: Never Used   Substance and Sexual Activity    Alcohol use:  Yes     Alcohol/week: 1.0 standard drinks     Types: 1 Shots of liquor per week     Comment: occ    Drug use: No    Sexual activity: Yes     Partners: Male   Lifestyle    Physical activity     Days per week: Not on file     Minutes per session: Not on file    Stress: Not on file   Relationships    Social connections     Talks on phone: Not on file     Gets together: Not on file     Attends Jew service: Not on file     Active member of club or organization: Not on file     Attends meetings of clubs or organizations: Not on file     Relationship status: Not on file    Intimate partner violence     Fear of current or ex partner: Not on file     Emotionally abused: Not on file     Physically abused: Not on file     Forced sexual activity: Not on file   Other Topics Concern    Not on file   Social History Narrative    Not on file       Physical Exam  NAD  ASSESSMENT and PLAN    ICD-10-CM ICD-9-CM    1. Urticaria  L50.9 708.9 predniSONE (DELTASONE) 20 mg tablet      doxepin (SINEquan) 10 mg capsule   2. Tobacco abuse  Z72.0 305.1 varenicline (CHANTIX STARTER MARIA R) 0.5 mg (11)- 1 mg (42) DsPk   3. Hypertension, essential  I10 401.9    4. Gastroesophageal reflux disease, esophagitis presence not specified  K21.9 530.81        Orders Placed This Encounter    predniSONE (DELTASONE) 20 mg tablet    doxepin (SINEquan) 10 mg capsule    varenicline (CHANTIX STARTER MARIA R) 0.5 mg (11)- 1 mg (42) DsPk      Discussed possible side affects, precautions, and drug interactions and possible benefits of the medication(s). Patient was instructed on the limits of making a diagnosis at this visit. Was instructed to call us or go to the emergency room if the symptoms increased or if new symptoms appeared. Discussed possible side affects, precautions, and drug interactions and possible benefits of the medication(s). Follow-up and Dispositions    · Return in about 6 weeks (around 8/18/2020). Keira Graham, who was evaluated through a synchronous (real-time) audio-video encounter, and/or her healthcare decision maker, is aware that it is a billable service, with coverage as determined by her insurance carrier. She provided verbal consent to proceed: Yes, and patient identification was verified.  It was conducted pursuant to the emergency declaration under the [address] [facility] waiver authority and the Coronavirus Preparedness and Response Supplemental Appropriations Act. A caregiver was present when appropriate. Ability to conduct physical exam was limited. I was in the office. The patient was at home.

## 2020-07-15 ENCOUNTER — VIRTUAL VISIT (OUTPATIENT)
Dept: INTERNAL MEDICINE CLINIC | Age: 55
End: 2020-07-15

## 2020-07-15 DIAGNOSIS — B37.0 THRUSH, ORAL: Primary | ICD-10-CM

## 2020-07-15 DIAGNOSIS — Z72.0 TOBACCO ABUSE: ICD-10-CM

## 2020-07-15 DIAGNOSIS — I10 HYPERTENSION, ESSENTIAL: ICD-10-CM

## 2020-07-15 DIAGNOSIS — L50.9 URTICARIA: ICD-10-CM

## 2020-07-15 RX ORDER — VARENICLINE TARTRATE 1 MG/1
1 TABLET, FILM COATED ORAL 2 TIMES DAILY
Qty: 60 TAB | Refills: 3 | Status: SHIPPED | OUTPATIENT
Start: 2020-07-15 | End: 2020-10-13

## 2020-07-15 RX ORDER — FLUCONAZOLE 150 MG/1
150 TABLET ORAL DAILY
Qty: 1 TAB | Refills: 1 | Status: SHIPPED | OUTPATIENT
Start: 2020-07-15 | End: 2020-07-16

## 2020-07-15 NOTE — PROGRESS NOTES
Chief Complaint   Patient presents with    Thrush     tongue burning yesterday 7/10 pain     Health Maintenance reviewed. I have reviewed the patient's medical history in detail and updated the computerized patient record. Patient has not been out of the country in (3-4 months) 90 -120 days, NO diarrhea, NO cough, NO chest conjestion, NO temp. Pt has not been around anyone with these symptoms. 1. Have you been to the ER, urgent care clinic since your last visit? Hospitalized since your last visit?no    2. Have you seen or consulted any other health care providers outside of the [address] [place] [address] since your last visit? Include any pap smears or colon screening. No    Encouraged pt to discuss pt's wishes with spouse/partner/family and bring them in the next appt to follow thru with the Advanced Directive      Fall Risk Assessment, last 12 mths 7/15/2020   Able to walk? Yes   Fall in past 12 months? No       3 most recent PHQ Screens 7/15/2020   Little interest or pleasure in doing things Several days   Feeling down, depressed, irritable, or hopeless Several days   Total Score PHQ 2 2       Abuse Screening Questionnaire 7/15/2020   Do you ever feel afraid of your partner? N   Are you in a relationship with someone who physically or mentally threatens you? N   Is it safe for you to go home?  Y       ADL Assessment 7/15/2020   Feeding yourself No Help Needed   Getting from bed to chair No Help Needed   Getting dressed No Help Needed   Bathing or showering No Help Needed   Walk across the room (includes cane/walker) No Help Needed   Using the telphone No Help Needed   Taking your medications No Help Needed   Preparing meals No Help Needed   Managing money (expenses/bills) No Help Needed   Moderately strenuous housework (laundry) No Help Needed   Shopping for personal items (toiletries/medicines) No Help Needed   Shopping for groceries No Help Needed   Driving No Help Needed   Climbing a flight of stairs No Help Needed   Getting to places beyond walking distances No Help Needed

## 2020-07-15 NOTE — PROGRESS NOTES
HISTORY OF PRESENT ILLNESS  Carlitos Estes is a 50 y.o. female. Alejandro Glassing   The history is provided by the patient. This is a new problem. The current episode started yesterday. The problem has been gradually worsening. Pertinent negatives include no chest pain and no shortness of breath. Associated symptoms comments: tongue. Exacerbated by: OJ.   SWelling itching resolved, finished steroids    Current Outpatient Medications   Medication Sig Dispense Refill    doxepin (SINEquan) 10 mg capsule Take 1 Cap by mouth three (3) times daily. As needed 90 Cap 1    varenicline (CHANTIX STARTER MARIA R) 0.5 mg (11)- 1 mg (42) DsPk Use as directed 1 Dose Pack 0    omeprazole (PRILOSEC) 40 mg capsule Take 1 capsule by mouth once daily 90 Cap 3    amLODIPine (NORVASC) 5 mg tablet Take 1 tablet by mouth once daily 90 Tab 3    meloxicam (MOBIC) 7.5 mg tablet Take 1 tablet by mouth once daily 90 Tab 3    albuterol (PROVENTIL HFA, VENTOLIN HFA, PROAIR HFA) 90 mcg/actuation inhaler Take 1 Puff by inhalation every six (6) hours as needed for Wheezing. 1 Inhaler 5    aspirin delayed-release 81 mg tablet Take 81 mg by mouth. No Known Allergies      Review of Systems   Constitutional: Negative for fever. Respiratory: Negative for cough and shortness of breath. Cardiovascular: Negative for chest pain. Gastrointestinal: Negative for heartburn. Reports taking blood pressure medications without side affects. No complaints of exertional chest pain, excessive shortness of breath or focal weakness. Minimal swelling in lower legs or dizziness with standing.     Social History     Socioeconomic History    Marital status:      Spouse name: Not on file    Number of children: Not on file    Years of education: Not on file    Highest education level: Not on file   Occupational History    Not on file   Social Needs    Financial resource strain: Not on file    Food insecurity     Worry: Not on file     Inability: Not on file  Transportation needs     Medical: Not on file     Non-medical: Not on file   Tobacco Use    Smoking status: Former Smoker     Packs/day: 0.50     Years: 20.00     Pack years: 10.00     Last attempt to quit: 2019     Years since quittin.6    Smokeless tobacco: Never Used   Substance and Sexual Activity    Alcohol use: Yes     Alcohol/week: 1.0 standard drinks     Types: 1 Shots of liquor per week     Comment: occ    Drug use: No    Sexual activity: Yes     Partners: Male   Lifestyle    Physical activity     Days per week: Not on file     Minutes per session: Not on file    Stress: Not on file   Relationships    Social connections     Talks on phone: Not on file     Gets together: Not on file     Attends [employer] service: Not on file     Active member of club or organization: Not on file     Attends meetings of clubs or organizations: Not on file     Relationship status: Not on file    Intimate partner violence     Fear of current or ex partner: Not on file     Emotionally abused: Not on file     Physically abused: Not on file     Forced sexual activity: Not on file   Other Topics Concern    Not on file   Social History Narrative    Not on file       Physical Exam  NAD  ASSESSMENT and PLAN  Encounter Diagnoses   Name Primary?  Thrush, oral Yes    Tobacco abuse     Hypertension, essential     Urticaria      Orders Placed This Encounter    fluconazole (DIFLUCAN) 150 mg tablet    varenicline (CHANTIX) 1 mg tablet     Riddhi Ken, who was evaluated through a synchronous (real-time) audio-video encounter, and/or her healthcare decision maker, is aware that it is a billable service, with coverage as determined by her insurance carrier. She provided verbal consent to proceed: Yes, and patient identification was verified.  It was conducted pursuant to the emergency declaration under the [address] [facility] [place] [address] and Response Supplemental Appropriations Act. A caregiver was present when appropriate. Ability to conduct physical exam was limited. I was in the office. The patient was at home     Patient was instructed on the limits of making a diagnosis at this visit. Was instructed to call us or go to the emergency room if the symptoms increased or if new symptoms appeared.         F/u 1-2 mo

## 2020-08-18 ENCOUNTER — VIRTUAL VISIT (OUTPATIENT)
Dept: INTERNAL MEDICINE CLINIC | Age: 55
End: 2020-08-18
Payer: COMMERCIAL

## 2020-08-18 DIAGNOSIS — B37.0 THRUSH: ICD-10-CM

## 2020-08-18 DIAGNOSIS — Z12.39 SCREENING FOR BREAST CANCER: ICD-10-CM

## 2020-08-18 DIAGNOSIS — M72.2 PLANTAR FASCIITIS, LEFT: Primary | ICD-10-CM

## 2020-08-18 DIAGNOSIS — Z72.0 TOBACCO ABUSE: ICD-10-CM

## 2020-08-18 PROCEDURE — 99214 OFFICE O/P EST MOD 30 MIN: CPT | Performed by: FAMILY MEDICINE

## 2020-08-18 NOTE — LETTER
8/18/2020 1:11 PM 
 
Ms. Gayle Venegas [address] [id number] RE:  REFERRAL TO PODIATRY Dear Ms. Garcia: 
 
Enclosed you will find the above named referral.  Please do not hesitate to contact this office if you have any questions. Sincerely, Jeremy Milligan MD

## 2020-08-18 NOTE — PROGRESS NOTES
Chief Complaint   Patient presents with   William Herbert     follow up     Health Maintenance reviewed. I have reviewed the patient's medical history in detail and updated the computerized patient record. Patient has not been out of the country in (5-6 months), NO diarrhea, NO cough, NO chest conjestion, NO temp. Pt has not been around anyone with these symptoms. 1. Have you been to the ER, urgent care clinic since your last visit? Hospitalized since your last visit?no    2. Have you seen or consulted any other health care providers outside of the [address] [place] [address] since your last visit? Include any pap smears or colon screening. No      Encouraged pt to discuss pt's wishes with spouse/partner/family and bring them in the next appt to follow thru with the Advanced Directive    Fall Risk Assessment, last 12 mths 8/18/2020   Able to walk? Yes   Fall in past 12 months? No       3 most recent PHQ Screens 8/18/2020   Little interest or pleasure in doing things Several days   Feeling down, depressed, irritable, or hopeless Several days   Total Score PHQ 2 2       Abuse Screening Questionnaire 8/18/2020   Do you ever feel afraid of your partner? N   Are you in a relationship with someone who physically or mentally threatens you? N   Is it safe for you to go home?  Y       ADL Assessment 8/18/2020   Feeding yourself No Help Needed   Getting from bed to chair No Help Needed   Getting dressed No Help Needed   Bathing or showering No Help Needed   Walk across the room (includes cane/walker) No Help Needed   Using the telphone No Help Needed   Taking your medications No Help Needed   Preparing meals No Help Needed   Managing money (expenses/bills) No Help Needed   Moderately strenuous housework (laundry) No Help Needed   Shopping for personal items (toiletries/medicines) No Help Needed   Shopping for groceries No Help Needed   Driving No Help Needed   Climbing a flight of stairs No Help Needed   Getting to places beyond walking distances No Help Needed

## 2020-08-18 NOTE — PROGRESS NOTES
HISTORY OF PRESENT ILLNESS  Paul Underwood is a 50 y.o. female. Giovana Bonillae   The history is provided by the patient. This is a new problem. The current episode started more than 1 week ago. The problem has been resolved. Pertinent negatives include no chest pain and no shortness of breath. Associated symptoms comments: Soreness in mouth. C/o pain in footesp heel, not getting better no injury    Review of Systems   Constitutional: Negative for fever. Respiratory: Negative for shortness of breath. Cardiovascular: Negative for chest pain. Skin: Positive for rash. 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    Plantar fasciitis M72.2     Social History     Socioeconomic History    Marital status:      Spouse name: Not on file    Number of children: Not on file    Years of education: Not on file    Highest education level: Not on file   Occupational History    Not on file   Social Needs    Financial resource strain: Not on file    Food insecurity     Worry: Not on file     Inability: Not on file    Transportation needs     Medical: Not on file     Non-medical: Not on file   Tobacco Use    Smoking status: Former Smoker     Packs/day: 0.50     Years: 20.00     Pack years: 10.00     Last attempt to quit: 2019     Years since quittin.7    Smokeless tobacco: Never Used   Substance and Sexual Activity    Alcohol use:  Yes     Alcohol/week: 1.0 standard drinks     Types: 1 Shots of liquor per week     Comment: occ    Drug use: No    Sexual activity: Yes     Partners: Male   Lifestyle    Physical activity     Days per week: Not on file     Minutes per session: Not on file    Stress: Not on file   Relationships    Social connections     Talks on phone: Not on file     Gets together: Not on file     Attends [employer] service: Not on file     Active member of club or organization: Not on file     Attends meetings of clubs or organizations: Not on file     Relationship status: Not on file    Intimate partner violence     Fear of current or ex partner: Not on file     Emotionally abused: Not on file     Physically abused: Not on file     Forced sexual activity: Not on file   Other Topics Concern    Not on file   Social History Narrative    Not on file       Physical Exam  There were no vitals taken for this visit. WD WN female NAD  Heart RRR without murmers clicks or rubs  Lungs CTA  Abdo soft nontender  Ext no edema  Foot + pain heel palp NV intact  ASSESSMENT and PLAN  Encounter Diagnoses   Name Primary?  Plantar fasciitis, left Yes    Screening for breast cancer     Tobacco abuse     Thrush      Orders Placed This Encounter    FRANKIE 3D BURT W MAMMO BI SCREENING INCL CAD    REFERRAL TO PODIATRY       Current Outpatient Medications   Medication Sig Dispense Refill    varenicline (CHANTIX) 1 mg tablet Take 1 Tab by mouth two (2) times a day for 90 days. 60 Tab 3    doxepin (SINEquan) 10 mg capsule Take 1 Cap by mouth three (3) times daily. As needed 90 Cap 1    varenicline (CHANTIX STARTER MARIA R) 0.5 mg (11)- 1 mg (42) DsPk Use as directed 1 Dose Pack 0    omeprazole (PRILOSEC) 40 mg capsule Take 1 capsule by mouth once daily 90 Cap 3    amLODIPine (NORVASC) 5 mg tablet Take 1 tablet by mouth once daily 90 Tab 3    meloxicam (MOBIC) 7.5 mg tablet Take 1 tablet by mouth once daily 90 Tab 3    albuterol (PROVENTIL HFA, VENTOLIN HFA, PROAIR HFA) 90 mcg/actuation inhaler Take 1 Puff by inhalation every six (6) hours as needed for Wheezing. 1 Inhaler 5    aspirin delayed-release 81 mg tablet Take 81 mg by mouth. The patient was counseled on the dangers of tobacco use, and was advised to quit. Reviewed strategies to maximize success, including pharmacotherapy (chantix). Follow-up and Dispositions    · Return in about 4 weeks (around 9/15/2020).

## 2020-10-19 ENCOUNTER — TELEPHONE (OUTPATIENT)
Dept: INTERNAL MEDICINE CLINIC | Age: 55
End: 2020-10-19

## 2020-10-19 DIAGNOSIS — L50.9 URTICARIA: Primary | ICD-10-CM

## 2020-10-19 NOTE — TELEPHONE ENCOUNTER
Pt called in reference to needing a referral to the dermatologist for her breaking out. Please call her at [phone].

## 2020-11-13 DIAGNOSIS — Z12.39 SCREENING FOR BREAST CANCER: ICD-10-CM

## 2021-01-29 ENCOUNTER — TELEPHONE (OUTPATIENT)
Dept: INTERNAL MEDICINE CLINIC | Age: 56
End: 2021-01-29

## 2021-01-29 DIAGNOSIS — B37.9 CANDIDIASIS: Primary | ICD-10-CM

## 2021-01-29 RX ORDER — FLUCONAZOLE 150 MG/1
150 TABLET ORAL DAILY
Qty: 1 TAB | Refills: 0 | Status: SHIPPED | OUTPATIENT
Start: 2021-01-29 | End: 2021-01-30

## 2021-01-29 NOTE — TELEPHONE ENCOUNTER
From [facility]          Level 1/Escalated Issue     Caller's first and last name and relationship (if not the patient):   pt     Best contact number(s):   [phone]     What are the symptoms:   Yeast infection     Transfer successful - yes/no (include outcome):   No answer     Transfer declined - yes/no (include reason):   na     Was caller advised to seek appropriate level of care - yes/no:   yes     Details to clarify the request:   Pt stated, she went to ER Wednesday and pt was prescribed antibiotic. Pt stated, since then she has gotten a yeast infections. Requesting for medication to help with the yeast infection to be sent to [facility] DR BHANU LU in pt's file.      Rama Gunderson

## 2021-02-04 ENCOUNTER — VIRTUAL VISIT (OUTPATIENT)
Dept: INTERNAL MEDICINE CLINIC | Age: 56
End: 2021-02-04
Payer: COMMERCIAL

## 2021-02-04 DIAGNOSIS — E87.6 HYPOKALEMIA: ICD-10-CM

## 2021-02-04 DIAGNOSIS — K80.20 MULTIPLE CALCULI OF GALLBLADDER: ICD-10-CM

## 2021-02-04 DIAGNOSIS — K21.9 GASTROESOPHAGEAL REFLUX DISEASE, UNSPECIFIED WHETHER ESOPHAGITIS PRESENT: ICD-10-CM

## 2021-02-04 DIAGNOSIS — E78.5 HYPERLIPIDEMIA, UNSPECIFIED HYPERLIPIDEMIA TYPE: ICD-10-CM

## 2021-02-04 DIAGNOSIS — Z87.448 HX OF ACUTE RENAL FAILURE: ICD-10-CM

## 2021-02-04 DIAGNOSIS — R07.9 CHEST PAIN, UNSPECIFIED TYPE: ICD-10-CM

## 2021-02-04 DIAGNOSIS — I10 HYPERTENSION, ESSENTIAL: Primary | ICD-10-CM

## 2021-02-04 DIAGNOSIS — E83.42 HYPOMAGNESEMIA: ICD-10-CM

## 2021-02-04 DIAGNOSIS — D50.9 IRON DEFICIENCY ANEMIA, UNSPECIFIED IRON DEFICIENCY ANEMIA TYPE: ICD-10-CM

## 2021-02-04 PROCEDURE — 99214 OFFICE O/P EST MOD 30 MIN: CPT | Performed by: NURSE PRACTITIONER

## 2021-02-04 RX ORDER — LANOLIN ALCOHOL/MO/W.PET/CERES
325 CREAM (GRAM) TOPICAL
COMMUNITY
Start: 2021-01-27 | End: 2021-03-19 | Stop reason: SDUPTHER

## 2021-02-04 RX ORDER — CEPHALEXIN 500 MG/1
500 CAPSULE ORAL 2 TIMES DAILY
COMMUNITY
Start: 2021-01-27 | End: 2021-03-05

## 2021-02-04 RX ORDER — LANOLIN ALCOHOL/MO/W.PET/CERES
400 CREAM (GRAM) TOPICAL DAILY
COMMUNITY
Start: 2021-01-27

## 2021-02-04 RX ORDER — POTASSIUM CHLORIDE 750 MG/1
TABLET, FILM COATED, EXTENDED RELEASE ORAL
COMMUNITY
Start: 2021-01-27

## 2021-02-04 NOTE — PROGRESS NOTES
Subjective: (As above and below)     Chief Complaint   Patient presents with   [facility] Follow Up     patient visited the [facility] ER 1 week ago for chest pain - was treated for anemia and potassium and magnesium were low - needs to have potassium checked again per Dr Lindsay Lowry     HPI  She is a 50y.o. year old female who presents for evaluation. Follow up from ER visit. K was low. Take Klor 10meq bid. Mag was low. Mag ox 400mg daily. Feeling well. Took Diflucan last week for candidiasis and resolved. No further chest pain. On antibiotic for UTI. Finishing up Cephalexin tomorrow. No more tightness. Reviewed PmHx, RxHx, FmHx, SocHx, AllgHx and updated in chart. 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    Plantar fasciitis M72.2     Review of Systems: none  Gen: no fatigue, fever, chills  Eyes: no excessive tearing, itching, or discharge  Nose: no rhinorrhea, no sinus pain  Mouth: no oral lesions, no sore throat  Resp: no shortness of breath, no wheezing, no cough  CV: no chest pain, no paroxysmal nocturnal dyspnea  Abd: no nausea, no heartburn, no diarrhea, no constipation, no abdominal pain  Neuro: no headaches, no syncope or presyncopal episodes  Endo: no polyuria, no polydipsia  Heme: no lymphadenopathy, no easy bruising or bleeding    No Known Allergies  Current Outpatient Medications   Medication Sig    cephALEXin (KEFLEX) 500 mg capsule 500 mg two (2) times a day.  ferrous sulfate 325 mg (65 mg iron) tablet Take 325 mg by mouth.  magnesium oxide (MAG-OX) 400 mg tablet Take 400 mg by mouth daily.  potassium chloride SR (KLOR-CON 10) 10 mEq tablet     doxepin (SINEquan) 10 mg capsule Take 1 Cap by mouth three (3) times daily.  As needed    omeprazole (PRILOSEC) 40 mg capsule Take 1 capsule by mouth once daily    amLODIPine (NORVASC) 5 mg tablet Take 1 tablet by mouth once daily    meloxicam (MOBIC) 7.5 mg tablet Take 1 tablet by mouth once daily    albuterol (PROVENTIL HFA, VENTOLIN HFA, PROAIR HFA) 90 mcg/actuation inhaler Take 1 Puff by inhalation every six (6) hours as needed for Wheezing.  aspirin delayed-release 81 mg tablet Take 81 mg by mouth.  varenicline (CHANTIX STARTER MARIA R) 0.5 mg (11)- 1 mg (42) DsPk Use as directed     No current facility-administered medications for this visit. Objective: There were no vitals taken for this visit. Physical Examination:   Gen: alert, oriented, no acute distress  Head: normocephalic, atraumatic  Ears: external auditory canals clear, TMs without erythema or effusion  Eyes: pupils equal round reactive to light, sclera clear, conjunctiva clear  Nose: normal turbinates, no rhinorrhea  Oral: moist mucus membranes, no oral lesions, no pharyngeal inflammation or exudate  Neck: supple, no lymphadenopathy  Resp: no increased work of breathing, lungs clear to ausculation bilaterally  CV: S1, S2 normal, no murmurs, rubs, or gallops. Abd: soft, not tender, not distended. No hepatosplenomegaly. Normal bowel sounds. No hernias. Neuro: cranial nerves intact, normal strength and movement in all extremities, reflexes and sensation intact and symmetric. Skin: no lesion or rash    Assessment/ Plan:   No diagnosis found. There are no diagnoses linked to this encounter. I have discussed the diagnosis with the patient and the intended plan as seen in the above orders. The patient has received an after-visit summary and questions were answered concerning future plans. If symptoms worsen, go to the   ER.     Medication Side Effects and Warnings were discussed with patient: yes  Patient Labs were reviewed: yes  Patient Past Records were reviewed:  yes    Mary Ann Lim NP

## 2021-02-04 NOTE — PROGRESS NOTES
Chief Complaint   Patient presents with   [facility] Follow Up     patient visited the [place] [id number] ER 1 week ago for chest pain - was treated for anemia and potassium and magnesium were low - needs to have potassium checked again per Dr April Schwartz, last 12 mths 2/4/2021   Able to walk? Yes   Fall in past 12 months? 0   Do you feel unsteady? 0   Are you worried about falling 0       3 most recent PHQ Screens 2/4/2021   Little interest or pleasure in doing things Not at all   Feeling down, depressed, irritable, or hopeless Not at all   Total Score PHQ 2 0       Abuse Screening Questionnaire 2/4/2021   Do you ever feel afraid of your partner? N   Are you in a relationship with someone who physically or mentally threatens you? N   Is it safe for you to go home?  Y       ADL Assessment 2/4/2021   Feeding yourself No Help Needed   Getting from bed to chair No Help Needed   Getting dressed No Help Needed   Bathing or showering No Help Needed   Walk across the room (includes cane/walker) No Help Needed   Using the telphone No Help Needed   Taking your medications No Help Needed   Preparing meals No Help Needed   Managing money (expenses/bills) No Help Needed   Moderately strenuous housework (laundry) No Help Needed   Shopping for personal items (toiletries/medicines) No Help Needed   Shopping for groceries No Help Needed   Driving No Help Needed   Climbing a flight of stairs No Help Needed   Getting to places beyond walking distances No Help Needed

## 2021-02-07 NOTE — PROGRESS NOTES
Anh Montiel is a 50 y.o. female, evaluated via audio-only technology on 2/4/2021 for:  Chief Complaint   Patient presents with   [facility] Follow Up     patient visited the [facility] ER 1 week ago for chest pain - was treated for anemia and potassium and magnesium were low - needs to have potassium checked again per Dr Chai Regalado   12  Subjective:     HPI  She is a 50y.o. year old female who presents for evaluation. Excerpt from [facility] ER 1/27/2021 [place]. Wayne Sotelo is a 53-year-old female, with a long history of dyspnea on exertion and nonexertional chest tightness, who comes in with an episode of chest tightness that began this morning around 6:00 a.m. when she awakened. The tight feeling persisted around her left breast. She is not any more short of breath than baseline, and has not noticed any change in her exertional dyspnea lately. She did have left foot surgery last summer, and states that the left foot and ankle continue to swell toward the end of every day. She is unsure if this pattern has changed recently. She has had no other recent illness, and has had no fever. She has no cough. She has had no known COVID-19 exposure. She does not have a history of coronary artery disease, and had a stress test years ago. Her brother had a cardiac bypass surgery at the age of 50. Her mother has some sort of heart trouble, but is still alive. Interim note 2/4/2021  Follow up from ER visit. K was low. Take Klor 10meq bid. Mag was low and was prescribed Mag ox 400mg daily. Feeling well. Took Diflucan last week for candidiasis and resolved. No further chest pain. On antibiotic for UTI. Finishing up Cephalexin tomorrow. No more tightness. Prior to Admission medications    Medication Sig Start Date End Date Taking? Authorizing Provider   cephALEXin (KEFLEX) 500 mg capsule 500 mg two (2) times a day. 1/27/21  Yes Provider, Historical   ferrous sulfate 325 mg (65 mg iron) tablet Take 325 mg by mouth. 21 Yes Provider, Historical   magnesium oxide (MAG-OX) 400 mg tablet Take 400 mg by mouth daily. 21  Yes Provider, Historical   potassium chloride SR (KLOR-CON 10) 10 mEq tablet  21  Yes Provider, Historical   doxepin (SINEquan) 10 mg capsule Take 1 Cap by mouth three (3) times daily. As needed 20  Yes Marcia Oliveros MD   omeprazole (PRILOSEC) 40 mg capsule Take 1 capsule by mouth once daily 20  Yes Marcia Oliveros MD   amLODIPine [facility]) 5 mg tablet Take 1 tablet by mouth once daily 20  Yes Marcia Oliveros MD   meloxicam [facility]) 7.5 mg tablet Take 1 tablet by mouth once daily 20  Yes Marcia Oliveros MD   albuterol (PROVENTIL HFA, VENTOLIN HFA, PROAIR HFA) 90 mcg/actuation inhaler Take 1 Puff by inhalation every six (6) hours as needed for Wheezing. 20  Yes Marcia Oliveros MD   aspirin delayed-release 81 mg tablet Take 81 mg by mouth. Yes Provider, Historical   varenicline (CHANTIX STARTER MARIA R) 0.5 mg (11)- 1 mg (42) DsPk Use as directed 20   Marcia Oliveros MD     No Known Allergies  Past Medical History:   Diagnosis Date    Asthma     GERD (gastroesophageal reflux disease)      Past Surgical History:   Procedure Laterality Date    HX APPENDECTOMY  2019    HX TUBAL LIGATION      btl     Family History   Problem Relation Age of Onset    Cancer Father      Social History     Tobacco Use    Smoking status: Former Smoker     Packs/day: 0.50     Years: 20.00     Pack years: 10.00     Quit date: 2019     Years since quittin.1    Smokeless tobacco: Never Used   Substance Use Topics    Alcohol use: Yes     Alcohol/week: 1.0 standard drinks     Types: 1 Shots of liquor per week     Comment: occ       ROS    No flowsheet data found. Assessment & Plan:   The complexity of medical decision making for this visit is moderate         ICD-10-CM ICD-9-CM    1.  Hypertension, essential  L35 180.8 METABOLIC PANEL, COMPREHENSIVE      CBC WITH AUTOMATED DIFF TSH 3RD GENERATION   2. Gastroesophageal reflux disease, unspecified whether esophagitis present  C77.1 289.29 METABOLIC PANEL, COMPREHENSIVE      BILIRUBIN, TOTAL   3. Chest pain, unspecified type  N93.7 730.68 METABOLIC PANEL, COMPREHENSIVE      CBC WITH AUTOMATED DIFF      TSH 3RD GENERATION      HEMOGLOBIN A1C WITH EAG      BILIRUBIN, TOTAL      RETICULOCYTE COUNT   4. Hyperlipidemia, unspecified hyperlipidemia type  E78.5 272.4 LIPID PANEL      HEMOGLOBIN A1C WITH EAG      BILIRUBIN, TOTAL   5. Hypomagnesemia  G07.75 348.4 METABOLIC PANEL, COMPREHENSIVE      MAGNESIUM      BILIRUBIN, TOTAL   6. Hypokalemia  L15.6 679.3 METABOLIC PANEL, COMPREHENSIVE      BILIRUBIN, TOTAL   7. Multiple calculi of gallbladder  E94.73 185.69 METABOLIC PANEL, COMPREHENSIVE      BILIRUBIN, TOTAL   8. Iron deficiency anemia, unspecified iron deficiency anemia type  D50.9 280.9 CBC WITH AUTOMATED DIFF      IRON PROFILE      FERRITIN   9. Hx of acute renal failure  G30.209 Z95.05 METABOLIC PANEL, COMPREHENSIVE     1. Hypertension, essential  - METABOLIC PANEL, COMPREHENSIVE; Future  - CBC WITH AUTOMATED DIFF; Future  - TSH 3RD GENERATION; Future    2. Gastroesophageal reflux disease, unspecified whether esophagitis present  - METABOLIC PANEL, COMPREHENSIVE; Future  - BILIRUBIN, TOTAL; Future    3. Chest pain, unspecified type  - METABOLIC PANEL, COMPREHENSIVE; Future  - CBC WITH AUTOMATED DIFF; Future  - TSH 3RD GENERATION; Future  - HEMOGLOBIN A1C WITH EAG; Future  - BILIRUBIN, TOTAL; Future  - RETICULOCYTE COUNT; Future    4. Hyperlipidemia, unspecified hyperlipidemia type  - LIPID PANEL; Future  - HEMOGLOBIN A1C WITH EAG; Future  - BILIRUBIN, TOTAL; Future    5. Hypomagnesemia  - METABOLIC PANEL, COMPREHENSIVE; Future  - MAGNESIUM; Future  - BILIRUBIN, TOTAL; Future    6. Hypokalemia  - METABOLIC PANEL, COMPREHENSIVE; Future  - BILIRUBIN, TOTAL; Future    7.  Multiple calculi of gallbladder  - METABOLIC PANEL, COMPREHENSIVE; Future  - BILIRUBIN, TOTAL; Future    8. Iron deficiency anemia, unspecified iron deficiency anemia type  - CBC WITH AUTOMATED DIFF; Future  - IRON PROFILE; Future  - FERRITIN; Future    9. Hx of acute renal failure  - METABOLIC PANEL, COMPREHENSIVE; Future    Labs pending. Follow up in 4 weeks. Renata Espino, who was evaluated through a patient-initiated, synchronous (real-time) audio only encounter, and/or her healthcare decision maker, is aware that it is a billable service, with coverage as determined by her insurance carrier. She provided verbal consent to proceed: Yes. She has not had a related appointment within my department in the past 7 days or scheduled within the next 24 hours. If symptoms worsen and necessary, call 911 or go to nearest ER.      Total Time: minutes: 21-30 minutes    Manuela Torres NP

## 2021-02-12 LAB
ALBUMIN SERPL-MCNC: 4.2 G/DL (ref 3.8–4.9)
ALBUMIN/GLOB SERPL: 1.2 {RATIO} (ref 1.2–2.2)
ALP SERPL-CCNC: 86 IU/L (ref 39–117)
ALT SERPL-CCNC: 40 IU/L (ref 0–32)
AST SERPL-CCNC: 42 IU/L (ref 0–40)
BASOPHILS # BLD AUTO: 0.1 X10E3/UL (ref 0–0.2)
BASOPHILS NFR BLD AUTO: 1 %
BILIRUB SERPL-MCNC: 0.3 MG/DL (ref 0–1.2)
BUN SERPL-MCNC: 11 MG/DL (ref 6–24)
BUN/CREAT SERPL: 17 (ref 9–23)
CALCIUM SERPL-MCNC: 9.4 MG/DL (ref 8.7–10.2)
CHLORIDE SERPL-SCNC: 104 MMOL/L (ref 96–106)
CHOLEST SERPL-MCNC: 159 MG/DL (ref 100–199)
CO2 SERPL-SCNC: 26 MMOL/L (ref 20–29)
CREAT SERPL-MCNC: 0.63 MG/DL (ref 0.57–1)
EOSINOPHIL # BLD AUTO: 0.2 X10E3/UL (ref 0–0.4)
EOSINOPHIL NFR BLD AUTO: 4 %
ERYTHROCYTE [DISTWIDTH] IN BLOOD BY AUTOMATED COUNT: 23.7 % (ref 11.7–15.4)
EST. AVERAGE GLUCOSE BLD GHB EST-MCNC: 103 MG/DL
FERRITIN SERPL-MCNC: 91 NG/ML (ref 15–150)
GLOBULIN SER CALC-MCNC: 3.5 G/DL (ref 1.5–4.5)
GLUCOSE SERPL-MCNC: 94 MG/DL (ref 65–99)
HBA1C MFR BLD: 5.2 % (ref 4.8–5.6)
HCT VFR BLD AUTO: 35.5 % (ref 34–46.6)
HDLC SERPL-MCNC: 30 MG/DL
HGB BLD-MCNC: 11.1 G/DL (ref 11.1–15.9)
IMM GRANULOCYTES # BLD AUTO: 0 X10E3/UL (ref 0–0.1)
IMM GRANULOCYTES NFR BLD AUTO: 0 %
INTERPRETATION, 910389: NORMAL
IRON SATN MFR SERPL: 7 % (ref 15–55)
IRON SERPL-MCNC: 29 UG/DL (ref 27–159)
LDLC SERPL CALC-MCNC: 74 MG/DL (ref 0–99)
LYMPHOCYTES # BLD AUTO: 1.7 X10E3/UL (ref 0.7–3.1)
LYMPHOCYTES NFR BLD AUTO: 32 %
MAGNESIUM SERPL-MCNC: 1.9 MG/DL (ref 1.6–2.3)
MCH RBC QN AUTO: 25.1 PG (ref 26.6–33)
MCHC RBC AUTO-ENTMCNC: 31.3 G/DL (ref 31.5–35.7)
MCV RBC AUTO: 80 FL (ref 79–97)
MONOCYTES # BLD AUTO: 0.4 X10E3/UL (ref 0.1–0.9)
MONOCYTES NFR BLD AUTO: 7 %
MORPHOLOGY BLD-IMP: ABNORMAL
NEUTROPHILS # BLD AUTO: 3.1 X10E3/UL (ref 1.4–7)
NEUTROPHILS NFR BLD AUTO: 56 %
PLATELET # BLD AUTO: 288 X10E3/UL (ref 150–450)
POTASSIUM SERPL-SCNC: 3.5 MMOL/L (ref 3.5–5.2)
PROT SERPL-MCNC: 7.7 G/DL (ref 6–8.5)
RBC # BLD AUTO: 4.43 X10E6/UL (ref 3.77–5.28)
RETICS/RBC NFR AUTO: 2.4 % (ref 0.6–2.6)
SODIUM SERPL-SCNC: 143 MMOL/L (ref 134–144)
TIBC SERPL-MCNC: 432 UG/DL (ref 250–450)
TRIGL SERPL-MCNC: 339 MG/DL (ref 0–149)
TSH SERPL DL<=0.005 MIU/L-ACNC: 1.21 UIU/ML (ref 0.45–4.5)
UIBC SERPL-MCNC: 403 UG/DL (ref 131–425)
VLDLC SERPL CALC-MCNC: 55 MG/DL (ref 5–40)
WBC # BLD AUTO: 5.4 X10E3/UL (ref 3.4–10.8)

## 2021-03-05 ENCOUNTER — OFFICE VISIT (OUTPATIENT)
Dept: INTERNAL MEDICINE CLINIC | Age: 56
End: 2021-03-05
Payer: COMMERCIAL

## 2021-03-05 VITALS
OXYGEN SATURATION: 97 % | HEIGHT: 65 IN | SYSTOLIC BLOOD PRESSURE: 148 MMHG | WEIGHT: 194 LBS | TEMPERATURE: 97 F | BODY MASS INDEX: 32.32 KG/M2 | DIASTOLIC BLOOD PRESSURE: 88 MMHG | RESPIRATION RATE: 16 BRPM | HEART RATE: 93 BPM

## 2021-03-05 DIAGNOSIS — Z71.2 ENCOUNTER TO DISCUSS TEST RESULTS: Primary | ICD-10-CM

## 2021-03-05 DIAGNOSIS — E61.1 LOW IRON: ICD-10-CM

## 2021-03-05 DIAGNOSIS — R17 ELEVATED BILIRUBIN: ICD-10-CM

## 2021-03-05 DIAGNOSIS — I10 HYPERTENSION, ESSENTIAL: ICD-10-CM

## 2021-03-05 DIAGNOSIS — T07.XXXA MULTIPLE BRUISES: ICD-10-CM

## 2021-03-05 DIAGNOSIS — Z87.440 HX: UTI (URINARY TRACT INFECTION): ICD-10-CM

## 2021-03-05 DIAGNOSIS — R74.01 ELEVATED ALANINE AMINOTRANSFERASE (ALT) LEVEL: ICD-10-CM

## 2021-03-05 LAB
BILIRUB UR QL STRIP: NEGATIVE
GLUCOSE UR-MCNC: NEGATIVE MG/DL
KETONES P FAST UR STRIP-MCNC: NEGATIVE MG/DL
PH UR STRIP: 6.5 [PH] (ref 4.6–8)
PROT UR QL STRIP: NEGATIVE
SP GR UR STRIP: 1.02 (ref 1–1.03)
UA UROBILINOGEN AMB POC: NORMAL (ref 0.2–1)
URINALYSIS CLARITY POC: CLEAR
URINALYSIS COLOR POC: YELLOW
URINE BLOOD POC: NEGATIVE
URINE LEUKOCYTES POC: NEGATIVE
URINE NITRITES POC: NEGATIVE

## 2021-03-05 PROCEDURE — 99214 OFFICE O/P EST MOD 30 MIN: CPT | Performed by: NURSE PRACTITIONER

## 2021-03-05 PROCEDURE — 81001 URINALYSIS AUTO W/SCOPE: CPT | Performed by: NURSE PRACTITIONER

## 2021-03-05 NOTE — PROGRESS NOTES
Subjective: (As above and below)     Chief Complaint   Patient presents with    Labs     review of labs    Foot Swelling     HPI  She is a 50y.o. year old female who presents for evaluation. Eats sweets once a year  Does not eat healthy diet  2 shots liquor daily. Reviewed labs. ALT elevated   TRIG elevated   Iron sat low    /88 not at goal.  Mild left ankle swelling. Discussed that Amlodipine can cause that. Reviewed PmHx, RxHx, FmHx, SocHx, AllgHx and updated in chart. 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    Plantar fasciitis M72.2     Review of Systems:  Gen: no fatigue, fever, chills  Eyes: no excessive tearing, itching, or discharge  Nose: no rhinorrhea, no sinus pain  Mouth: no oral lesions, no sore throat  Resp: no shortness of breath, no wheezing, no cough  CV: no chest pain, no paroxysmal nocturnal dyspnea; left mild ankle edema  Abd: no nausea, no heartburn, no diarrhea, no constipation, no abdominal pain  Neuro: no headaches, no syncope or presyncopal episodes  Endo: no polyuria, no polydipsia  Heme: no lymphadenopathy, no easy bruising or bleeding    No Known Allergies  Current Outpatient Medications   Medication Sig    cephALEXin (KEFLEX) 500 mg capsule 500 mg two (2) times a day.  ferrous sulfate 325 mg (65 mg iron) tablet Take 325 mg by mouth.  magnesium oxide (MAG-OX) 400 mg tablet Take 400 mg by mouth daily.  potassium chloride SR (KLOR-CON 10) 10 mEq tablet     doxepin (SINEquan) 10 mg capsule Take 1 Cap by mouth three (3) times daily.  As needed    varenicline (CHANTIX STARTER MARIA R) 0.5 mg (11)- 1 mg (42) DsPk Use as directed    omeprazole (PRILOSEC) 40 mg capsule Take 1 capsule by mouth once daily    amLODIPine (NORVASC) 5 mg tablet Take 1 tablet by mouth once daily    meloxicam (MOBIC) 7.5 mg tablet Take 1 tablet by mouth once daily    albuterol (PROVENTIL HFA, VENTOLIN HFA, PROAIR HFA) 90 mcg/actuation inhaler Take 1 Puff by inhalation every six (6) hours as needed for Wheezing.  aspirin delayed-release 81 mg tablet Take 81 mg by mouth. No current facility-administered medications for this visit. Objective:     Visit Vitals  BP (!) 148/88 (BP 1 Location: Left upper arm, BP Patient Position: At rest, BP Cuff Size: Large adult)   Pulse 93   Temp 97 °F (36.1 °C) (Temporal)   Resp 16   Ht 5' 5\" (1.651 m)   Wt 194 lb (88 kg)   SpO2 97%   BMI 32.28 kg/m²      Physical Examination:   Gen: alert, oriented, no acute distress  Neck: supple, no lymphadenopathy  Resp: no increased work of breathing, lungs clear to ausculation bilaterally  CV: S1, S2 normal, no murmurs, rubs, or gallops. Abd: soft, not tender, not distended. No hepatosplenomegaly. Normal bowel sounds. No hernias. Skin: no lesion or rash; bruises scattered on arms, chest    Assessment/ Plan:        ICD-10-CM ICD-9-CM    1. Encounter to discuss test results  Z71.2 V65.49    2. Low iron  E61.1 280.9 TRANSFERRIN      VITAMIN B12      FOLATE      RETICULOCYTE COUNT   3. Elevated alanine aminotransferase (ALT) level  R74.01 790.4 HEPATITIS PANEL, ACUTE   4. Elevated bilirubin  R17 277.4 BILIRUBIN, DIRECT      BILIRUBIN, TOTAL   5. Hx: UTI (urinary tract infection)  Z87.440 V13.02 AMB POC URINALYSIS DIP STICK AUTO W/ MICRO   6. Multiple bruises  T07. XXXA 924.8 PROTHROMBIN TIME + INR      RETICULOCYTE COUNT     1. Low iron  - TRANSFERRIN; Future  - VITAMIN B12; Future  - FOLATE; Future  - RETICULOCYTE COUNT; Future    2. Elevated alanine aminotransferase (ALT) level  - HEPATITIS PANEL, ACUTE; Future    3. Elevated bilirubin  - BILIRUBIN, DIRECT; Future  - BILIRUBIN, TOTAL; Future    4. Hx: UTI (urinary tract infection)  - AMB POC URINALYSIS DIP STICK AUTO W/ MICRO    5. Multiple bruises  - PROTHROMBIN TIME + INR; Future  - RETICULOCYTE COUNT; Future    6.  Encounter to discuss test results    Labs pending. Continue healthy diet  Follow up phone call 3 weeks. Will discuss treating triglycerides next visit    I have discussed the diagnosis with the patient and the intended plan as seen in the above orders. The patient has received an after-visit summary and questions were answered concerning future plans. If symptoms worsen, go to the ER.     Medication Side Effects and Warnings were discussed with patient: yes  Patient Labs were reviewed: yes  Patient Past Records were reviewed:  yes    Ermelinda Elizabeth NP

## 2021-03-05 NOTE — PROGRESS NOTES
Chief Complaint   Patient presents with    Labs     review of labs    Foot Swelling     Fall Risk Assessment, last 12 mths 3/5/2021   Able to walk? Yes   Fall in past 12 months? 0   Do you feel unsteady? 0   Are you worried about falling 0       3 most recent PHQ Screens 3/5/2021   Little interest or pleasure in doing things Not at all   Feeling down, depressed, irritable, or hopeless Not at all   Total Score PHQ 2 0       Abuse Screening Questionnaire 3/5/2021   Do you ever feel afraid of your partner? N   Are you in a relationship with someone who physically or mentally threatens you? N   Is it safe for you to go home?  Y       ADL Assessment 3/5/2021   Feeding yourself No Help Needed   Getting from bed to chair No Help Needed   Getting dressed No Help Needed   Bathing or showering No Help Needed   Walk across the room (includes cane/walker) No Help Needed   Using the telphone No Help Needed   Taking your medications No Help Needed   Preparing meals No Help Needed   Managing money (expenses/bills) No Help Needed   Moderately strenuous housework (laundry) No Help Needed   Shopping for personal items (toiletries/medicines) No Help Needed   Shopping for groceries No Help Needed   Driving No Help Needed   Climbing a flight of stairs No Help Needed   Getting to places beyond walking distances No Help Needed

## 2021-03-05 NOTE — PATIENT INSTRUCTIONS
Gastroesophageal Reflux Disease (GERD): Care Instructions Overview Gastroesophageal reflux disease (GERD) is the backward flow of stomach acid into the esophagus. The esophagus is the tube that leads from your throat to your stomach. A one-way valve prevents the stomach acid from backing up into this tube. But when you have GERD, this valve does not close tightly enough. This can also cause pain and swelling in your esophagus. (This is called esophagitis.) If you have mild GERD symptoms including heartburn, you may be able to control the problem with antacids or over-the-counter medicine. You can also make lifestyle changes to help reduce your symptoms. These include changing your diet and eating habits, such as not eating late at night and losing weight. Follow-up care is a key part of your treatment and safety. Be sure to make and go to all appointments, and call your doctor if you are having problems. It's also a good idea to know your test results and keep a list of the medicines you take. How can you care for yourself at home? · Take your medicines exactly as prescribed. Call your doctor if you think you are having a problem with your medicine. · Your doctor may recommend over-the-counter medicine. For mild or occasional indigestion, antacids, such as Tums, Gaviscon, Mylanta, or Maalox, may help. Your doctor also may recommend over-the-counter acid reducers, such as famotidine (Pepcid AC), cimetidine (Tagamet HB), or omeprazole (Prilosec). Read and follow all instructions on the label. If you use these medicines often, talk with your doctor. · Change your eating habits. ? It's best to eat several small meals instead of two or three large meals. ? After you eat, wait 2 to 3 hours before you lie down. ? Chocolate, mint, and alcohol can make GERD worse. ? Spicy foods, foods that have a lot of acid (like tomatoes and oranges), and coffee can make GERD symptoms worse in some people. If your symptoms are worse after you eat a certain food, you may want to stop eating that food to see if your symptoms get better. · Do not smoke or chew tobacco. Smoking can make GERD worse. If you need help quitting, talk to your doctor about stop-smoking programs and medicines. These can increase your chances of quitting for good. · If you have GERD symptoms at night, raise the head of your bed 6 to 8 inches by putting the frame on blocks or placing a foam wedge under the head of your mattress. (Adding extra pillows does not work.) · Do not wear tight clothing around your middle. · Lose weight if you need to. Losing just 5 to 10 pounds can help. When should you call for help? Call your doctor now or seek immediate medical care if: 
  · You have new or different belly pain.  
  · Your stools are black and tarlike or have streaks of blood. Watch closely for changes in your health, and be sure to contact your doctor if: 
  · Your symptoms have not improved after 2 days.  
  · Food seems to catch in your throat or chest.  
Where can you learn more? Go to http://www.gray.com/ Enter W098 in the search box to learn more about \"Gastroesophageal Reflux Disease (GERD): Care Instructions. \" Current as of: April 15, 2020               Content Version: 12.6 © 0234-2397 [employer]. Care instructions adapted under license by [employer] (which disclaims liability or warranty for this information). If you have questions about a medical condition or this instruction, always ask your healthcare professional. [address] any warranty or liability for your use of this information. Bilirubin: About This Test 
What is it? A bilirubin test measures the amount of bilirubin in your blood. Bilirubin is a substance produced when the liver breaks down old red blood cells. When bilirubin levels are high, the skin and whites of the eyes may look yellow (jaundice). This may be caused by liver disease. Why is this test done? This test looks at how well your liver is working. It watches for signs of liver disease, such as hepatitis, or the effects of medicines that can harm the liver. It also can see if something is blocking the bile ducts. It can help with decisions about whether newborns with jaundice need treatment. How do you prepare for the test? 
· Adults should not eat or drink for 4 hours before the test. 
· For children, there's generally nothing you have to do before this test, unless your doctor tells you to. How is the test done? A health professional uses a needle to take a blood sample, usually from the arm. How long does the test take? The test will take a few minutes. What happens after the test? 
· You will probably be able to go home right away. · You can go back to your usual activities right away. Follow-up care is a key part of your treatment and safety. Be sure to make and go to all appointments, and call your doctor if you are having problems. It's also a good idea to keep a list of the medicines you take. Ask your doctor when you can expect to have your test results. Where can you learn more? Go to http://www.gray.com/ Enter Q077 in the search box to learn more about \"Bilirubin: About This Test.\" Current as of: December 9, 2019               Content Version: 12.6 © 5499-6634 [employer]. Care instructions adapted under license by [employer] (which disclaims liability or warranty for this information). If you have questions about a medical condition or this instruction, always ask your healthcare professional. [address] any warranty or liability for your use of this information. Biliary Colic: Care Instructions Your Care Instructions Biliary (say \"BILL-ee-air-ee\") colic is belly pain caused by gallbladder problems. It is usually caused by a gallstone moving through or blocking the common bile duct or cystic duct. Gallstones are stones that form in the gallbladder. They also can form in the common bile duct or cystic duct. These ducts carry bile from the gallbladder and the liver to the small intestine. Gallstones may be as small as a grain of sand. Or they may be as large as a golf ball. Gallstones that cause severe symptoms usually are treated with surgery to remove the gallbladder. If the first attack of biliary colic is mild, it is often safe to wait until you have had another attack before you think about having surgery. The doctor has checked you carefully. But problems can develop later. If you notice any problems or new symptoms, get medical treatment right away. Follow-up care is a key part of your treatment and safety. Be sure to make and go to all appointments, and call your doctor if you are having problems. It's also a good idea to know your test results and keep a list of the medicines you take. How can you care for yourself at home? · Take pain medicines exactly as directed. ? If the doctor gave you a prescription medicine for pain, take it as prescribed. ? If you are not taking a prescription pain medicine, ask your doctor if you can take an over-the-counter medicine. Read and follow all instructions on the label. · Avoid foods that cause symptoms, especially fatty foods. These can cause biliary colic. · You may need more tests to look at your gallbladder. When should you call for help? Call your doctor now or seek immediate medical care if: 
  · You have a fever.  
  · You have new belly pain, or your pain gets worse.  
  · There is a new or increasing yellow tint to your skin or the whites of your eyes.  
  · Your urine is dark yellow-brown, or your stools are light-colored or white.  
  · You cannot keep down fluids. Watch closely for changes in your health, and be sure to contact your doctor if: 
  · You do not get better as expected.  
  · You are not getting better after 1 day (24 hours). Where can you learn more? Go to http://www.gray.com/ Enter T645 in the search box to learn more about \"Biliary Colic: Care Instructions. \" Current as of: April 15, 2020               Content Version: 12.6 © 8889-2481 [employer]. Care instructions adapted under license by "[employer] (which disclaims liability or warranty for this information). If you have questions about a medical condition or this instruction, always ask your healthcare professional. [address] any warranty or liability for your use of this information. Learning About Gallstones What are gallstones? Gallstones are stones that form in the gallbladder. The gallbladder is a small sac located just under the liver. It stores bile released by the liver. Bile helps you digest fats. Gallstones can be smaller than a grain of sand or as large as a golf ball. Gallstones form when cholesterol and other things found in bile make stones. They can also form if the gallbladder doesn't empty as it should. Gallstones can also form in the common bile duct or cystic duct. These tubes carry bile from the gallbladder and the liver to the small intestine. What happens when you have gallstones? The progression of gallstones depends on whether you have symptoms. Most people with gallstones have no symptoms and do not need treatment. The most common problem caused by gallstones occurs when a gallstone blocks the cystic duct that drains the gallbladder. It often causes bouts of pain that come and go as the gallbladder contracts and expands. The bouts of pain are often severe and steady. The pain can last from 15 minutes to up to 6 hours. And the pain may get worse after a meal. Symptoms usually improve within a few days. If the pain is severe or if you have had gallbladder pain before, you may need to have your gallbladder removed. In rare cases, gallstones can cause pancreatitis, an inflammation of the pancreas. Gallstones back up the flow of digestive enzymes made by the pancreas. Pancreatitis may cause sudden, severe belly pain, loss of appetite, nausea and vomiting, and fever. What are the symptoms? Most people who have gallstones don't have symptoms. When symptoms occur, they can include: 
· Pain in the pit of your stomach or in the upper right part of your belly. It may spread to your right upper back or shoulder blade area. · Pain that may come and go or be steady. It may get worse when you eat. · Fever and chills, if a gallstone is blocking a bile duct and causing an infection. · Yellowing of your skin and the whites of your eyes. Pain can last 15 minutes to 24 hours. Continuous pain for 1 to 5 hours is common. The pain may begin at night and be severe enough to wake you. Pain often starts after eating food that is high in fat. The pain usually makes it hard to get comfortable. Moving around doesn't make the pain go away. How can you prevent gallstones? There is no sure way to prevent gallstones. But you can reduce your risk of forming gallstones that can cause symptoms. · Stay at a healthy weight. If you need to lose weight, do so slowly and sensibly. · Eat regular, balanced meals. · Be active, and exercise regularly. How are gallstones treated? · If you don't have symptoms, you probably don't need treatment. · For mild symptoms, your doctor may have you take pain medicine and wait to see if the pain goes away. · For severe pain or infection, or if you have more than one gallstone attack, your doctor may suggest surgery to have your gallbladder removed. The body works fine without a gallbladder. Follow-up care is a key part of your treatment and safety. Be sure to make and go to all appointments, and call your doctor if you are having problems. It's also a good idea to know your test results and keep a list of the medicines you take. Where can you learn more? Go to http://www.[employer]/ Enter  in the search box to learn more about \"Learning About Gallstones. \" Current as of: April 15, 2020               Content Version: 12.6 © 7278-0816 [employer]. Care instructions adapted under license by [employer] (which disclaims liability or warranty for this information). If you have questions about a medical condition or this instruction, always ask your healthcare professional. [place] [address] any warranty or liability for your use of this information. Get labs done Healthy diet Follow up phone call 3 weeks. Will discuss treating trig next visit

## 2021-04-07 LAB
BILIRUB DIRECT SERPL-MCNC: 0.11 MG/DL (ref 0–0.4)
BILIRUB SERPL-MCNC: 0.3 MG/DL (ref 0–1.2)
FOLATE SERPL-MCNC: 2.3 NG/ML
HAV IGM SERPL QL IA: NEGATIVE
HBV CORE IGM SERPL QL IA: NEGATIVE
HBV SURFACE AG SERPL QL IA: NEGATIVE
HCV AB S/CO SERPL IA: <0.1 S/CO RATIO (ref 0–0.9)
INR PPP: 1 (ref 0.9–1.2)
PROTHROMBIN TIME: 10.4 SEC (ref 9.1–12)
RETICS/RBC NFR AUTO: 1.8 % (ref 0.6–2.6)
TRANSFERRIN SERPL-MCNC: 319 MG/DL (ref 192–364)
VIT B12 SERPL-MCNC: 313 PG/ML (ref 232–1245)

## 2021-04-09 ENCOUNTER — VIRTUAL VISIT (OUTPATIENT)
Dept: INTERNAL MEDICINE CLINIC | Age: 56
End: 2021-04-09
Payer: COMMERCIAL

## 2021-04-09 DIAGNOSIS — E61.1 LOW IRON: ICD-10-CM

## 2021-04-09 DIAGNOSIS — E53.8 LOW FOLATE: ICD-10-CM

## 2021-04-09 DIAGNOSIS — Z71.2 ENCOUNTER TO DISCUSS TEST RESULTS: Primary | ICD-10-CM

## 2021-04-09 PROCEDURE — 99442 PR PHYS/QHP TELEPHONE EVALUATION 11-20 MIN: CPT | Performed by: NURSE PRACTITIONER

## 2021-04-09 NOTE — PROGRESS NOTES
Chief Complaint   Patient presents with    Labs     FU     Patient is aware that this is a Virtual Visit or Phone Call Only doctor's visit. Patient is aware that this is a Virtual Visit or Phone Call Only doctor's visit. Patient has not been out of the country in (14 months), NO diarrhea, NO cough, NO chest conjestion, NO temp. Pt has not been around anyone with these symptoms. Health Maintenance reviewed. I have reviewed the patient's medical history in detail and updated the computerized patient record. 1. Have you been to the ER, urgent care clinic since your last visit? Hospitalized since your last visit? 2.  Have you seen or consulted any other health care providers outside of the [address] [place] [address] since your last visit? Include any pap smears or colon screening. Encouraged pt to discuss pt's wishes with spouse/partner/family and bring them in the next appt to follow thru with the Advanced Directive      Fall Risk Assessment, last 12 mths 3/5/2021   Able to walk? Yes   Fall in past 12 months? 0   Do you feel unsteady? 0   Are you worried about falling 0       3 most recent PHQ Screens 3/5/2021   Little interest or pleasure in doing things Not at all   Feeling down, depressed, irritable, or hopeless Not at all   Total Score PHQ 2 0       Abuse Screening Questionnaire 4/9/2021   Do you ever feel afraid of your partner? N   Are you in a relationship with someone who physically or mentally threatens you? N   Is it safe for you to go home?  Y       ADL Assessment 4/9/2021   Feeding yourself No Help Needed   Getting from bed to chair No Help Needed   Getting dressed No Help Needed   Bathing or showering No Help Needed   Walk across the room (includes cane/walker) No Help Needed   Using the telphone No Help Needed   Taking your medications No Help Needed   Preparing meals No Help Needed   Managing money (expenses/bills) No Help Needed   Moderately strenuous housework (laundry) No Help Needed   Shopping for personal items (toiletries/medicines) No Help Needed   Shopping for groceries No Help Needed   Driving No Help Needed   Climbing a flight of stairs No Help Needed   Getting to places beyond walking distances No Help Needed

## 2021-04-09 NOTE — PROGRESS NOTES
Jericho White is a 50 y.o. female, evaluated via audio-only technology on 4/9/2021 for Labs (FU)    Chief Complaint   Patient presents with    Labs     FU     Assessment & Plan:   The complexity of medical decision making for this visit is moderate   12  Subjective:   Jericho White is a 50 y.o. female who presents for lab review. Hx GERD, asthma    Discussed  labs. Discontinued potassium and magnesium. Eats a  banana every other day. Folate low- add folic acid supplement. Decrease alcohol 1 per day. Limit fatty foods- will give meds if needed. Hep panel negative. Take Vit C to help absorb iron supplement. Prior to Admission medications    Medication Sig Start Date End Date Taking? Authorizing Provider   ferrous sulfate 325 mg (65 mg iron) tablet Take 1 Tab by mouth daily. 3/20/21 3/20/22 Yes Carl Schneider MD   magnesium oxide (MAG-OX) 400 mg tablet Take 400 mg by mouth daily. 1/27/21  Yes Provider, Historical   potassium chloride SR (KLOR-CON 10) 10 mEq tablet  1/27/21  Yes Provider, Historical   omeprazole (PRILOSEC) 40 mg capsule Take 1 capsule by mouth once daily 4/13/20  Yes Carl Schneider MD   amLODIPine (NORVASC) 5 mg tablet Take 1 tablet by mouth once daily 4/13/20  Yes Carl Schneider MD   meloxicam DAMIAN MARROQUIN [facility]) 7.5 mg tablet Take 1 tablet by mouth once daily 4/13/20  Yes Carl Schneider MD   albuterol (PROVENTIL HFA, VENTOLIN HFA, PROAIR HFA) 90 mcg/actuation inhaler Take 1 Puff by inhalation every six (6) hours as needed for Wheezing. 4/13/20  Yes Carl Schneider MD   aspirin delayed-release 81 mg tablet Take 81 mg by mouth.    Yes Provider, Historical     No Known Allergies  Past Medical History:   Diagnosis Date    Asthma     GERD (gastroesophageal reflux disease)      Past Surgical History:   Procedure Laterality Date    HX APPENDECTOMY  12/03/2019    HX TUBAL LIGATION      btl     Family History   Problem Relation Age of Onset    Diabetes Mother     High Cholesterol Mother     Hypertension Mother     Heart Failure Mother     Cancer Father      Social History     Tobacco Use    Smoking status: Former Smoker     Packs/day: 0.50     Years: 20.00     Pack years: 10.00     Quit date: 2019     Years since quittin.3    Smokeless tobacco: Never Used   Substance Use Topics    Alcohol use: Yes     Alcohol/week: 1.0 standard drinks     Types: 1 Shots of liquor per week     Comment: occ     Review of Systems   Constitutional: Negative for chills and fever. Respiratory: Negative for cough and shortness of breath. Cardiovascular: Negative for chest pain. Neurological: Negative for dizziness and headaches. No flowsheet data found. ICD-10-CM ICD-9-CM    1. Encounter to discuss test results  Z71.2 V65.49    2. Low folate  E53.8 266.2 vit B Cmplx 3-FA-Vit C-Biotin (NEPHRO JUANPABLO RX) 1- mg-mg-mcg tablet   3. Low iron  E61.1 280.9      1. Low folate  - vit B Cmplx 3-FA-Vit C-Biotin (NEPHRO JUANPABLO RX) 1- mg-mg-mcg tablet; Take 1 Tab by mouth daily. Indications: vitamin deficiency  Dispense: 27 Tab; Refill: 3    2. Encounter to discuss test results    3. Low iron    Folic acid vitamin B daily  Low fat diet and exercise. Follow up 3 months. Len Lind, who was evaluated through a patient-initiated, synchronous (real-time) audio only encounter, and/or her healthcare decision maker, is aware that it is a billable service, with coverage as determined by her insurance carrier. She provided verbal consent to proceed: Yes. She has not had a related appointment within my department in the past 7 days or scheduled within the next 24 hours. I was in the office and pt was at home during the visit. If symptoms worsen and necessary, call 911 or go to nearest ER.      Total Time: minutes: 11-20 minutes    Enedelia Lozano NP

## 2021-11-01 DIAGNOSIS — E53.8 LOW FOLATE: ICD-10-CM

## 2021-11-01 NOTE — TELEPHONE ENCOUNTER
Pharmacy refill request      Requested Prescriptions     Pending Prescriptions Disp Refills    vit B Cmplx 3-FA-Vit C-Biotin (NEPHRO JUANPABLO RX) 1- mg-mg-mcg tablet 30 Tablet 3     Sig: Take 1 Tablet by mouth daily.  Indications: vitamin deficiency

## 2022-01-01 DIAGNOSIS — M79.673 PAIN OF FOOT, UNSPECIFIED LATERALITY: ICD-10-CM

## 2022-01-01 DIAGNOSIS — K21.9 GASTROESOPHAGEAL REFLUX DISEASE: ICD-10-CM

## 2022-01-02 RX ORDER — OMEPRAZOLE 40 MG/1
CAPSULE, DELAYED RELEASE ORAL
Qty: 90 CAPSULE | Refills: 0 | Status: SHIPPED | OUTPATIENT
Start: 2022-01-02

## 2022-01-02 RX ORDER — MELOXICAM 7.5 MG/1
TABLET ORAL
Qty: 90 TABLET | Refills: 0 | Status: SHIPPED | OUTPATIENT
Start: 2022-01-02 | End: 2022-06-11

## 2022-03-18 PROBLEM — K38.9 APPENDIX DISEASE: Status: ACTIVE | Noted: 2019-12-12

## 2022-03-18 PROBLEM — M72.2 PLANTAR FASCIITIS: Status: ACTIVE | Noted: 2020-08-18

## 2022-03-19 PROBLEM — K21.9 GERD (GASTROESOPHAGEAL REFLUX DISEASE): Status: ACTIVE | Noted: 2019-09-30

## 2022-03-20 PROBLEM — I10 HYPERTENSION, ESSENTIAL: Status: ACTIVE | Noted: 2019-09-30

## 2022-07-06 DIAGNOSIS — J45.20 MILD INTERMITTENT REACTIVE AIRWAY DISEASE WITHOUT COMPLICATION: ICD-10-CM

## 2022-07-06 RX ORDER — ALBUTEROL SULFATE 90 UG/1
AEROSOL, METERED RESPIRATORY (INHALATION)
Qty: 9 G | Refills: 0 | OUTPATIENT
Start: 2022-07-06

## 2022-09-29 DIAGNOSIS — M79.673 PAIN OF FOOT, UNSPECIFIED LATERALITY: ICD-10-CM

## 2022-09-29 RX ORDER — MELOXICAM 7.5 MG/1
TABLET ORAL
Qty: 90 TABLET | Refills: 0 | OUTPATIENT
Start: 2022-09-29

## 2022-11-04 DIAGNOSIS — J45.20 MILD INTERMITTENT REACTIVE AIRWAY DISEASE WITHOUT COMPLICATION: ICD-10-CM

## 2022-11-04 DIAGNOSIS — M79.673 PAIN OF FOOT, UNSPECIFIED LATERALITY: ICD-10-CM

## 2022-11-06 RX ORDER — ALBUTEROL SULFATE 90 UG/1
AEROSOL, METERED RESPIRATORY (INHALATION)
Qty: 9 G | Refills: 0 | Status: SHIPPED | OUTPATIENT
Start: 2022-11-06

## 2022-11-06 RX ORDER — MELOXICAM 7.5 MG/1
TABLET ORAL
Qty: 90 TABLET | Refills: 0 | Status: SHIPPED | OUTPATIENT
Start: 2022-11-06

## 2022-11-10 ENCOUNTER — OFFICE VISIT (OUTPATIENT)
Dept: SLEEP MEDICINE | Age: 57
End: 2022-11-10
Payer: COMMERCIAL

## 2022-11-10 ENCOUNTER — DOCUMENTATION ONLY (OUTPATIENT)
Dept: SLEEP MEDICINE | Age: 57
End: 2022-11-10

## 2022-11-10 VITALS
OXYGEN SATURATION: 100 % | SYSTOLIC BLOOD PRESSURE: 158 MMHG | HEIGHT: 65 IN | TEMPERATURE: 98.6 F | RESPIRATION RATE: 19 BRPM | BODY MASS INDEX: 29.82 KG/M2 | DIASTOLIC BLOOD PRESSURE: 89 MMHG | HEART RATE: 78 BPM | WEIGHT: 179 LBS

## 2022-11-10 DIAGNOSIS — E66.3 OVERWEIGHT: ICD-10-CM

## 2022-11-10 DIAGNOSIS — I10 PRIMARY HYPERTENSION: ICD-10-CM

## 2022-11-10 DIAGNOSIS — G47.33 OBSTRUCTIVE SLEEP APNEA (ADULT) (PEDIATRIC): Primary | ICD-10-CM

## 2022-11-10 PROCEDURE — 3074F SYST BP LT 130 MM HG: CPT | Performed by: INTERNAL MEDICINE

## 2022-11-10 PROCEDURE — 99204 OFFICE O/P NEW MOD 45 MIN: CPT | Performed by: INTERNAL MEDICINE

## 2022-11-10 PROCEDURE — 3078F DIAST BP <80 MM HG: CPT | Performed by: INTERNAL MEDICINE

## 2022-11-10 NOTE — PATIENT INSTRUCTIONS
217 [facility] [place], [address]  Tel.  [phone]  Fax. 100 [facility] 60  [place], [address] [facility]  Tel.  [phone]  Fax. [phone] [address]  Tel.  [phone]  Fax. [phone]     Sleep Apnea: After Your Visit  Your Care Instructions  Sleep apnea occurs when you frequently stop breathing for 10 seconds or longer during sleep. It can be mild to severe, based on the number of times per hour that you stop breathing or have slowed breathing. Blocked or narrowed airways in your nose, mouth, or throat can cause sleep apnea. Your airway can become blocked when your throat muscles and tongue relax during sleep. Sleep apnea is common, occurring in 1 out of 20 individuals. Individuals having any of the following characteristics should be evaluated and treated right away due to high risk and detrimental consequences from untreated sleep apnea:  Obesity  Congestive Heart failure  Atrial Fibrillation  Uncontrolled Hypertension  Type II Diabetes  Night-time Arrhythmias  Stroke  Pulmonary Hypertension  High-risk Driving Populations (pilots, truck drivers, etc.)  Patients Considering Weight-loss Surgery    How do you know you have sleep apnea? You probably have sleep apnea if you answer 'yes' to 3 or more of the following questions:  S - Have you been told that you Snore? T - Are you often Tired during the day? O - Has anyone Observed you stop breathing while sleeping? P- Do you have (or are being treated for) high blood Pressure? B - Are you obese (Body Mass Index > 35)? A - Is your Age 48years old or older? N - Is your Neck size greater than 16 inches? G - Are you male Gender? A sleep physician can prescribe a breathing device that prevents tissues in the throat from blocking your airway. Or your doctor may recommend using a dental device (oral breathing device) to help keep your airway open.  In some cases, surgery may be needed to remove enlarged tissues in the throat. Follow-up care is a key part of your treatment and safety. Be sure to make and go to all appointments, and call your doctor if you are having problems. It's also a good idea to know your test results and keep a list of the medicines you take. How can you care for yourself at home? Lose weight, if needed. It may reduce the number of times you stop breathing or have slowed breathing. Go to bed at the same time every night. Sleep on your side. It may stop mild apnea. If you tend to roll onto your back, sew a pocket in the back of your [email or web address] top. Put a tennis ball into the pocket, and stitch the pocket shut. This will help keep you from sleeping on your back. Avoid alcohol and medicines such as sleeping pills and sedatives before bed. Do not smoke. Smoking can make sleep apnea worse. If you need help quitting, talk to your doctor about stop-smoking programs and medicines. These can increase your chances of quitting for good. Prop up the head of your bed 4 to 6 inches by putting bricks under the legs of the bed. Treat breathing problems, such as a stuffy nose, caused by a cold or allergies. Use a continuous positive airway pressure (CPAP) breathing machine if lifestyle changes do not help your apnea and your doctor recommends it. The machine keeps your airway from closing when you sleep. If CPAP does not help you, ask your doctor whether you should try other breathing machines. A bilevel positive airway pressure machine has two types of air pressureâone for breathing in and one for breathing out. Another device raises or lowers air pressure as needed while you breathe. If your nose feels dry or bleeds when using one of these machines, talk with your doctor about increasing moisture in the air. A humidifier may help.   If your nose is runny or stuffy from using a breathing machine, talk with your doctor about using decongestants or a corticosteroid nasal spray.  When should you call for help? Watch closely for changes in your health, and be sure to contact your doctor if:  You still have sleep apnea even though you have made lifestyle changes. You are thinking of trying a device such as CPAP. You are having problems using a CPAP or similar machine. Where can you learn more? Go to ZapMebe. Enter Y005 in the search box to learn more about \"Sleep Apnea: After Your Visit. \"   © 2651-5535 Healthwise, Incorporated. Care instructions adapted under license by [facility] (which disclaims liability or warranty for this information). This care instruction is for use with your licensed healthcare professional. If you have questions about a medical condition or this instruction, always ask your healthcare professional. Nellie Barroso any warranty or liability for your use of this information. PROPER SLEEP HYGIENE    What to avoid  Do not have drinks with caffeine, such as coffee or black tea, for 8 hours before bed. Do not smoke or use other types of tobacco near bedtime. Nicotine is a stimulant and can keep you awake. Avoid drinking alcohol late in the evening, because it can cause you to wake in the middle of the night. Do not eat a big meal close to bedtime. If you are hungry, eat a light snack. Do not drink a lot of water close to bedtime, because the need to urinate may wake you up during the night. Do not read or watch TV in bed. Use the bed only for sleeping and sexual activity. What to try  Go to bed at the same time every night, and wake up at the same time every morning. Do not take naps during the day. Keep your bedroom quiet, dark, and cool. Get regular exercise, but not within 3 to 4 hours of your bedtime. .  Sleep on a comfortable pillow and mattress. If watching the clock makes you anxious, turn it facing away from you so you cannot see the time.   If you worry when you lie down, start a worry book. Well before bedtime, write down your worries, and then set the book and your concerns aside. Try meditation or other relaxation techniques before you go to bed. If you cannot fall asleep, get up and go to another room until you feel sleepy. Do something relaxing. Repeat your bedtime routine before you go to bed again. Make your house quiet and calm about an hour before bedtime. Turn down the lights, turn off the TV, log off the computer, and turn down the volume on music. This can help you relax after a busy day. Drowsy Driving  The [address] [place] [address] Road Traffic Safety Administration cites drowsiness as a causing factor in more than 665,166 police reported crashes annually, resulting in 76,000 injuries and 1,500 deaths. Other surveys suggest 55% of people polled have driven while drowsy in the past year, 23% had fallen asleep but not crashed, 3% crashed, and 2% had and accident due to drowsy driving. Who is at risk? Young Drivers: One study of drowsy driving accidents states that 55% of the drivers were under 25 years. Of those, 75% were male. Shift Workers and Travelers: People who work overnight or travel across time zones frequently are at higher risk of experiencing Circadian Rhythm Disorders. They are trying to work and function when their body is programed to sleep. Sleep Deprived: Lack of sleep has a serious impact on your ability to pay attention or focus on a task. Consistently getting less than the average of 8 hours your body needs creates partial or cumulative sleep deprivation. Untreated Sleep Disorders: Sleep Apnea, Narcolepsy, R.L.S., and other sleep disorders (untreated) prevent a person from getting enough restful sleep. This leads to excessive daytime sleepiness and increases the risk for drowsy driving accidents by up to 7 times. Medications / Alcohol: Even over the counter medications can cause drowsiness.  Medications that impair a drivers attention should have a warning label. Alcohol naturally makes you sleepy and on its own can cause accidents. Combined with excessive drowsiness its effects are amplified. Signs of Drowsy Driving:   * You don't remember driving the last few miles   * You may drift out of your antelmo   * You are unable to focus and your thoughts wander   * You may yawn more often than normal   * You have difficulty keeping your eyes open / nodding off   * Missing traffic signs, speeding, or tailgating  Prevention-   Good sleep hygiene, lifestyle and behavioral choices have the most impact on drowsy driving. There is no substitute for sleep and the average person requires 8 hours nightly. If you find yourself driving drowsy, stop and sleep. Consider the sleep hygiene tips provided during your visit as well. Medication Refill Policy: Refills for all medications require 1 week advance notice. Please have your pharmacy fax a refill request. We are unable to fax, or call in \"controled substance\" medications and you will need to pick these prescriptions up from our office. Seeking Alpha Activation    Thank you for requesting access to Seeking Alpha. Please follow the instructions below to securely access and download your online medical record. Seeking Alpha allows you to send messages to your doctor, view your test results, renew your prescriptions, schedule appointments, and more. How Do I Sign Up? In your internet browser, go to [email or web address]. Click on the First Time User? Click Here link in the Sign In box. You will see the New Member Sign Up page. Enter your Seeking Alpha Access Code exactly as it appears below. You will not need to use this code after youve completed the sign-up process. If you do not sign up before the expiration date, you must request a new code. Seeking Alpha Access Code:  Activation code not generated  Current Seeking Alpha Status: Active (This is the date your Seeking Alpha access code will )    Enter the last four digits of your Social Security Number (xxxx) and Date of Birth (mm/dd/yyyy) as indicated and click Submit. You will be taken to the next sign-up page. Create a mokono ID. This will be your mokono login ID and cannot be changed, so think of one that is secure and easy to remember. Create a mokono password. You can change your password at any time. Enter your Password Reset Question and Answer. This can be used at a later time if you forget your password. Enter your e-mail address. You will receive e-mail notification when new information is available in [address] Click Sign Up. You can now view and download portions of your medical record. Click the FameCast link to download a portable copy of your medical information. Additional Information    If you have questions, please call [phone]. Remember, mokono is NOT to be used for urgent needs. For medical emergencies, dial 911.

## 2022-11-10 NOTE — PROGRESS NOTES
217 [facility]. [place], [address]  Tel.  [phone]  Fax. 100 [facility] 60  [place], [address] [facility]  Tel.  [phone]  Fax. [phone] [address]   Tel.  [phone]  Fax. [phone]         Subjective:      Gavi Atkins is an 56 y.o. female referred  by Dr. Edwina Moya, for evaluation for a sleep disorder. She complains of snoring, periods of not breathing associated with excessive daytime sleepiness. Symptoms began 1 year ago, gradually worsening since that time. She usually can fall asleep in 30 minutes. Family or house members note snoring, periods of not breathing. She denies falling asleep while driving. Gavi Atkins does wake up frequently at night. She is not bothered by waking up too early and left unable to get back to sleep. She actually sleeps about 5 hours at night and wakes up about 4 times during the night. She does work shifts:  First Shift. Nani Gross indicates she does get too little sleep at night. Her bedtime is 0900. She awakens at 1200. She does take naps. She takes 7 naps a week lasting 2. She has the following observed behaviors: Loud snoring, Light snoring, Pauses in breathing (Waking with a gasp or snort , Nightmares);  . Other remarks:      [place] Sleepiness Score: 22   which reflect severe daytime drowsiness.     No Known Allergies      Current Outpatient Medications:     meloxicam (MOBIC) 7.5 mg tablet, Take 1 tablet by mouth once daily, Disp: 90 Tablet, Rfl: 0    ProAir HFA 90 mcg/actuation inhaler, INHALE 1 PUFF BY MOUTH EVERY 6 HOURS AS NEEDED FOR WHEEZING, Disp: 9 g, Rfl: 0    Iron, Ferrous Sulfate, 325 mg (65 mg iron) tablet, Take 1 tablet by mouth once daily, Disp: 30 Tablet, Rfl: 5    omeprazole (PRILOSEC) 40 mg capsule, Take 1 capsule by mouth once daily, Disp: 90 Capsule, Rfl: 0    Reina-Belen Rx 1- mg-mg-mcg tablet, Take 1 tablet by mouth once daily, Disp: 30 Tablet, Rfl: 5 vit B Cmplx 3-FA-Vit C-Biotin (NEPHRO JUANPABLO RX) 1- mg-mg-mcg tablet, Take 1 Tablet by mouth daily. Indications: vitamin deficiency, Disp: 30 Tablet, Rfl: 3    amLODIPine (NORVASC) 5 mg tablet, Take 1 tablet by mouth once daily, Disp: 90 Tablet, Rfl: 1    magnesium oxide (MAG-OX) 400 mg tablet, Take 400 mg by mouth daily. , Disp: , Rfl:     potassium chloride SR (KLOR-CON 10) 10 mEq tablet, , Disp: , Rfl:     aspirin delayed-release 81 mg tablet, Take 81 mg by mouth., Disp: , Rfl:      She  has a past medical history of Asthma and GERD (gastroesophageal reflux disease). She has no past medical history of CAD (coronary artery disease), Congestive heart failure ([facility] [address] or Diabetes ([facility] She  has a past surgical history that includes hx appendectomy (12/03/2019) and hx tubal ligation. She family history includes Cancer in her father; Diabetes in her mother; Heart Failure in her mother; High Cholesterol in her mother; Hypertension in her mother. She  reports that she quit smoking about 2 years ago. Her smoking use included cigarettes. She has a 10.00 pack-year smoking history. She has never used smokeless tobacco. She reports current alcohol use of about 1.0 standard drink per week. She reports that she does not use drugs. Review of Systems:  Constitutional:  almost 20 pound weight loss  Eyes:  No blurred vision.   CVS:  No significant chest pain  Pulm:  No significant shortness of breath  GI:  No significant nausea or vomiting  :  + significant nocturia  Musculoskeletal:  some shoulder and knee joint pain at night  Skin:  No significant rashes  Neuro:  No significant dizziness   Psych:  No active mood issues    Sleep Review of Systems: notable for no difficulty falling asleep; +frequent awakenings at night;  regular dreaming noted; no nightmares ; + early morning headaches; no memory problems; no concentration issues; no history of any automobile or occupational accidents due to daytime drowsiness. Objective:   Visit Vitals  BP (!) 158/89 (BP 1 Location: Right upper arm, BP Patient Position: Sitting, BP Cuff Size: Adult)   Pulse 78   Temp 98.6 °F (37 °C) (Temporal)   Resp 19   Ht 5' 5\" (1.651 m)   Wt 179 lb (81.2 kg)   SpO2 100%   BMI 29.79 kg/m²         General:   Not in acute distress   Eyes:  Anicteric sclerae, no obvious strabismus   Nose:  No obvious nasal septum deviation    Oropharynx:   Class 3 oropharyngeal outlet, thick tongue base,  low-lying soft palate, narrow tonsilo-pharyngeal pilars   Tonsils:   tonsils are present and normal   Neck:    ; midline trachea   Chest/Lungs:  Equal lung expansion, clear on auscultation    CVS:  Normal rate, regular rhythm; no JVD   Skin:  Warm to touch; no obvious rashes   Neuro:  No focal deficits ; no obvious tremor    Psych:  Normal affect,  normal countenance;          Assessment:       ICD-10-CM ICD-9-CM    1. Obstructive sleep apnea (adult) (pediatric)  G47.33 327.23 SLEEP STUDY UNATTENDED, 4 CHANNEL      2. Primary hypertension  I10 401.9       3. Overweight  E66.3 278.02             Plan:     * The patient currently has a High Risk for having sleep apnea. STOP-BANG score 5.  * PSG was ordered for initial evaluation. Treatment options for sleep apnea were reviewed. she would like to proceed with a trial of PAP if found to have significant apnea. * She was provided information on sleep apnea including coresponding risk factors and the importance of proper treatment. * Counseling was provided regarding proper sleep hygiene and safe driving. * Return for follow-up shortly after sleep study to go over results and to discuss treatment options if indicated. 2. Hypertension - suboptimally controlled on current regimen. she will continue her current regimen. she will continue to monitor at home and with her PMD for further adjustments as needed.     I have reviewed the relationship between hypertension as it relates to sleep-disordered breathing. 3. overweight - weight has been going down slowly. I have discussed the relationship of weight to obstructive sleep apnea. I have advised her to continue her efforts at weight loss    she understands that weight loss can reduce severity of sleep apnea and snoring. The treatment plan was reviewed with the patient in detail . she understands that the lead technologist will be calling her  with the results or notifying of results via [address] [place] [address] and assisting with the next step in the treatment plan as outlined today during the consultation with me. All of her questions were addressed. Thank you for allowing us to participate in your patient's medical care. We'll keep you updated on these investigations.   Electronically signed by    Betty Costa MD  Diplomate in Sleep Medicine  [facility]  11/10/2022

## 2022-11-17 RX ORDER — LANOLIN ALCOHOL/MO/W.PET/CERES
325 CREAM (GRAM) TOPICAL DAILY
Qty: 30 TABLET | Refills: 5 | Status: SHIPPED | OUTPATIENT
Start: 2022-11-17

## 2022-12-07 RX ORDER — ALBUTEROL SULFATE 90 UG/1
1 AEROSOL, METERED RESPIRATORY (INHALATION)
Qty: 18 G | Refills: 5 | Status: SHIPPED | OUTPATIENT
Start: 2022-12-07

## 2023-01-20 ENCOUNTER — OFFICE VISIT (OUTPATIENT)
Dept: SLEEP MEDICINE | Age: 58
End: 2023-01-20

## 2023-01-20 ENCOUNTER — HOSPITAL ENCOUNTER (OUTPATIENT)
Dept: SLEEP MEDICINE | Age: 58
Discharge: HOME | End: 2023-01-20
Payer: COMMERCIAL

## 2023-01-20 DIAGNOSIS — G47.33 OSA (OBSTRUCTIVE SLEEP APNEA): Primary | ICD-10-CM

## 2023-01-20 PROCEDURE — 95806 SLEEP STUDY UNATT&RESP EFFT: CPT | Performed by: INTERNAL MEDICINE

## 2023-01-25 ENCOUNTER — TELEPHONE (OUTPATIENT)
Dept: SLEEP MEDICINE | Age: 58
End: 2023-01-25

## 2023-01-25 DIAGNOSIS — G47.33 OBSTRUCTIVE SLEEP APNEA (ADULT) (PEDIATRIC): Primary | ICD-10-CM

## 2023-01-25 NOTE — TELEPHONE ENCOUNTER
Results of sleep study in [employer]  Lead tech to convey results to patient  HSAT results in [facility]. Test positive for significant sleep apnea. AHI 10/hour and lowest oxygen saturation was 78%. She did the home sleep test on 2 separate nights both recordings were positive for sleep apnea. we had discussed treatment options at initial consultation. Based on the results of the home sleep apnea test, I believe a trial of APAP would be an effective mode of therapy. APAP order attached. she should be seen in the sleep disorder center 4-6 weeks after initiating PAP therapy. Patient should call the office the day she gets set up with new PAP device so we can schedule her for an adherence/compliance visit within 31-90 days of obtaining a new device. Front staff to Order PAP and call patient and let them know which DME company they should be hearing from after results reviewed with lead support technologist.     she will need a first adherence visit.      We can schedule follow visit (virtual/in person) if she would like to discuss results further no

## 2023-01-26 ENCOUNTER — DOCUMENTATION ONLY (OUTPATIENT)
Dept: SLEEP MEDICINE | Age: 58
End: 2023-01-26

## 2023-01-26 NOTE — PROGRESS NOTES
PAP & supply order faxed to [address] [place] [address], scanned into media and letter mailed to patient.

## 2023-01-27 ENCOUNTER — DOCUMENTATION ONLY (OUTPATIENT)
Dept: SLEEP MEDICINE | Age: 58
End: 2023-01-27

## 2023-01-31 ENCOUNTER — DOCUMENTATION ONLY (OUTPATIENT)
Dept: SLEEP MEDICINE | Age: 58
End: 2023-01-31

## 2023-01-31 NOTE — PROGRESS NOTES
RECEIVED NOTIFICATION FROM [facility] THAT THEY ARE OUT OF NETWORK \WITH PATIENTS INSURANCE. PAP & SUPPLY ORDER FAXED TO [facility], SCANNED INTO MEDIA  AND UPDATED LETTER MAILED TO PATIENT.

## 2023-02-17 ENCOUNTER — DOCUMENTATION ONLY (OUTPATIENT)
Dept: SLEEP MEDICINE | Age: 58
End: 2023-02-17

## 2023-02-17 ENCOUNTER — PATIENT MESSAGE (OUTPATIENT)
Dept: SLEEP MEDICINE | Age: 58
End: 2023-02-17

## 2023-02-27 LAB — MAMMOGRAPHY, EXTERNAL: NORMAL

## 2023-03-16 DIAGNOSIS — M79.673 PAIN OF FOOT, UNSPECIFIED LATERALITY: ICD-10-CM

## 2023-03-16 RX ORDER — MELOXICAM 7.5 MG/1
TABLET ORAL
Qty: 90 TABLET | Refills: 0 | Status: SHIPPED | OUTPATIENT
Start: 2023-03-16

## 2023-06-30 RX ORDER — ALBUTEROL SULFATE 90 UG/1
AEROSOL, METERED RESPIRATORY (INHALATION)
Qty: 18 G | Refills: 1 | Status: SHIPPED | OUTPATIENT
Start: 2023-06-30 | End: 2023-07-20

## 2023-07-20 RX ORDER — ALBUTEROL SULFATE 90 UG/1
AEROSOL, METERED RESPIRATORY (INHALATION)
Qty: 18 G | Refills: 2 | Status: SHIPPED | OUTPATIENT
Start: 2023-07-20 | End: 2023-08-23

## 2023-07-20 NOTE — TELEPHONE ENCOUNTER
Requested Prescriptions     Pending Prescriptions Disp Refills    albuterol sulfate HFA (PROVENTIL;VENTOLIN;PROAIR) 108 (90 Base) MCG/ACT inhaler [Pharmacy Med Name: Albuterol Sulfate  (90 Base) MCG/ACT Inhalation Aerosol Solution] 18 g 0     Sig: INHALE 1 PUFF BY MOUTH EVERY 6 HOURS AS NEEDED FOR WHEEZING       Allergies:  No Known Allergies    Last visit with ordering provider: VV 4/9/21, OV 3/5/21  Next visit with ordering provider: Visit date not found     Current Outpatient Medications   Medication Instructions    albuterol sulfate HFA (PROVENTIL;VENTOLIN;PROAIR) 108 (90 Base) MCG/ACT inhaler INHALE 1 PUFF BY MOUTH EVERY 6 HOURS AS NEEDED FOR WHEEZING    amLODIPine (NORVASC) 5 MG tablet 1 tablet, Oral, DAILY    aspirin 81 mg, Oral    ferrous sulfate (IRON 325) 325 (65 Fe) MG tablet 1 tablet, Oral, DAILY    magnesium oxide (MAG-OX) 400 mg, Oral, DAILY    meloxicam (MOBIC) 7.5 MG tablet 1 tablet, Oral, DAILY    omeprazole (PRILOSEC) 40 MG delayed release capsule 1 capsule, Oral, DAILY    potassium chloride (KLOR-CON) 10 MEQ extended release tablet ceived the following from [facility]: Outside name: potassium chloride SR (KLOR-CON 10) 10 mEq tablet       Signed by Noreen TRINIDAD  07/20/23  2:26 PM

## 2023-08-23 RX ORDER — ALBUTEROL SULFATE 90 UG/1
AEROSOL, METERED RESPIRATORY (INHALATION)
Qty: 18 G | Refills: 0 | Status: SHIPPED | OUTPATIENT
Start: 2023-08-23

## 2023-08-23 NOTE — TELEPHONE ENCOUNTER
Requested Prescriptions     Pending Prescriptions Disp Refills    albuterol sulfate HFA (PROVENTIL;VENTOLIN;PROAIR) 108 (90 Base) MCG/ACT inhaler [Pharmacy Med Name: Albuterol Sulfate  (90 Base) MCG/ACT Inhalation Aerosol Solution] 18 g 0     Sig: INHALE 1 PUFF BY MOUTH EVERY 6 HOURS AS NEEDED FOR WHEEZING     Last fill 7/20/2023 for 18g w/ 2 addtnl  Last OV 8/8/2020  No appts scheduled at this time    Ja Hawkins LPN

## 2023-09-26 RX ORDER — ALBUTEROL SULFATE 90 UG/1
AEROSOL, METERED RESPIRATORY (INHALATION)
Qty: 18 G | Refills: 0 | Status: SHIPPED | OUTPATIENT
Start: 2023-09-26 | End: 2023-10-18

## 2023-11-27 ENCOUNTER — COMMUNITY OUTREACH (OUTPATIENT)
Facility: CLINIC | Age: 58
End: 2023-11-27

## 2024-04-17 RX ORDER — ALBUTEROL SULFATE 90 UG/1
AEROSOL, METERED RESPIRATORY (INHALATION)
Qty: 18 G | Refills: 1 | Status: SHIPPED | OUTPATIENT
Start: 2024-04-17

## 2024-08-09 RX ORDER — ALBUTEROL SULFATE 90 UG/1
AEROSOL, METERED RESPIRATORY (INHALATION)
Qty: 18 G | Refills: 3 | Status: SHIPPED | OUTPATIENT
Start: 2024-08-09

## 2025-05-16 RX ORDER — ALBUTEROL SULFATE 90 UG/1
INHALANT RESPIRATORY (INHALATION)
Qty: 18 G | Refills: 0 | OUTPATIENT
Start: 2025-05-16